# Patient Record
Sex: FEMALE | Race: WHITE | Employment: OTHER | URBAN - METROPOLITAN AREA
[De-identification: names, ages, dates, MRNs, and addresses within clinical notes are randomized per-mention and may not be internally consistent; named-entity substitution may affect disease eponyms.]

---

## 2017-09-15 ENCOUNTER — HOSPITAL ENCOUNTER (EMERGENCY)
Facility: HOSPITAL | Age: 82
Discharge: HOME | End: 2017-09-15
Attending: EMERGENCY MEDICINE
Payer: MEDICARE

## 2017-09-15 VITALS
BODY MASS INDEX: 39.27 KG/M2 | SYSTOLIC BLOOD PRESSURE: 160 MMHG | HEIGHT: 60 IN | TEMPERATURE: 99 F | DIASTOLIC BLOOD PRESSURE: 85 MMHG | RESPIRATION RATE: 19 BRPM | OXYGEN SATURATION: 98 % | WEIGHT: 200 LBS | HEART RATE: 78 BPM

## 2017-09-15 DIAGNOSIS — I49.8 SINUS ARRHYTHMIA: ICD-10-CM

## 2017-09-15 DIAGNOSIS — E11.65 TYPE 2 DIABETES MELLITUS WITH HYPERGLYCEMIA, WITH LONG-TERM CURRENT USE OF INSULIN (HCC): ICD-10-CM

## 2017-09-15 DIAGNOSIS — Z79.4 TYPE 2 DIABETES MELLITUS WITH HYPERGLYCEMIA, WITH LONG-TERM CURRENT USE OF INSULIN (HCC): ICD-10-CM

## 2017-09-15 DIAGNOSIS — L02.213 ABSCESS OF CHEST WALL: Primary | ICD-10-CM

## 2017-09-15 PROBLEM — R73.9 HYPERGLYCEMIA: Status: ACTIVE | Noted: 2017-09-15

## 2017-09-15 LAB
ANION GAP SERPL CALC-SCNC: 8 MMOL/L (ref 0–18)
BASOPHILS # BLD: 0 K/UL (ref 0–0.2)
BASOPHILS NFR BLD: 1 %
BUN SERPL-MCNC: 15 MG/DL (ref 8–20)
BUN/CREAT SERPL: 14.3 (ref 10–20)
CALCIUM SERPL-MCNC: 8.7 MG/DL (ref 8.5–10.5)
CHLORIDE SERPL-SCNC: 105 MMOL/L (ref 95–110)
CO2 SERPL-SCNC: 25 MMOL/L (ref 22–32)
CREAT SERPL-MCNC: 1.05 MG/DL (ref 0.5–1.5)
EOSINOPHIL # BLD: 0.2 K/UL (ref 0–0.7)
EOSINOPHIL NFR BLD: 2 %
ERYTHROCYTE [DISTWIDTH] IN BLOOD BY AUTOMATED COUNT: 13.6 % (ref 11–15)
GLUCOSE SERPL-MCNC: 203 MG/DL (ref 70–99)
HCT VFR BLD AUTO: 39.2 % (ref 35–48)
HGB BLD-MCNC: 13.3 G/DL (ref 12–16)
LACTATE SERPL-SCNC: 1 MMOL/L (ref 0.5–2.2)
LYMPHOCYTES # BLD: 1.3 K/UL (ref 1–4)
LYMPHOCYTES NFR BLD: 17 %
MCH RBC QN AUTO: 31.3 PG (ref 27–32)
MCHC RBC AUTO-ENTMCNC: 34 G/DL (ref 32–37)
MCV RBC AUTO: 92.1 FL (ref 80–100)
MONOCYTES # BLD: 0.8 K/UL (ref 0–1)
MONOCYTES NFR BLD: 10 %
NEUTROPHILS # BLD AUTO: 5.3 K/UL (ref 1.8–7.7)
NEUTROPHILS NFR BLD: 70 %
OSMOLALITY UR CALC.SUM OF ELEC: 293 MOSM/KG (ref 275–295)
PLATELET # BLD AUTO: 256 K/UL (ref 140–400)
PMV BLD AUTO: 6.4 FL (ref 7.4–10.3)
POTASSIUM SERPL-SCNC: 4.1 MMOL/L (ref 3.3–5.1)
RBC # BLD AUTO: 4.26 M/UL (ref 3.7–5.4)
SODIUM SERPL-SCNC: 138 MMOL/L (ref 136–144)
TROPONIN I SERPL-MCNC: 0.02 NG/ML (ref ?–0.03)
WBC # BLD AUTO: 7.6 K/UL (ref 4–11)

## 2017-09-15 PROCEDURE — 87070 CULTURE OTHR SPECIMN AEROBIC: CPT | Performed by: EMERGENCY MEDICINE

## 2017-09-15 PROCEDURE — 80048 BASIC METABOLIC PNL TOTAL CA: CPT | Performed by: EMERGENCY MEDICINE

## 2017-09-15 PROCEDURE — 99284 EMERGENCY DEPT VISIT MOD MDM: CPT

## 2017-09-15 PROCEDURE — 87147 CULTURE TYPE IMMUNOLOGIC: CPT | Performed by: EMERGENCY MEDICINE

## 2017-09-15 PROCEDURE — 83605 ASSAY OF LACTIC ACID: CPT | Performed by: EMERGENCY MEDICINE

## 2017-09-15 PROCEDURE — 10061 I&D ABSCESS COMP/MULTIPLE: CPT

## 2017-09-15 PROCEDURE — 85025 COMPLETE CBC W/AUTO DIFF WBC: CPT | Performed by: EMERGENCY MEDICINE

## 2017-09-15 PROCEDURE — 93005 ELECTROCARDIOGRAM TRACING: CPT

## 2017-09-15 PROCEDURE — 36415 COLL VENOUS BLD VENIPUNCTURE: CPT

## 2017-09-15 PROCEDURE — 93010 ELECTROCARDIOGRAM REPORT: CPT | Performed by: EMERGENCY MEDICINE

## 2017-09-15 PROCEDURE — 87205 SMEAR GRAM STAIN: CPT | Performed by: EMERGENCY MEDICINE

## 2017-09-15 PROCEDURE — 84484 ASSAY OF TROPONIN QUANT: CPT | Performed by: EMERGENCY MEDICINE

## 2017-09-15 PROCEDURE — 87186 SC STD MICRODIL/AGAR DIL: CPT | Performed by: EMERGENCY MEDICINE

## 2017-09-15 RX ORDER — INSULIN LISPRO 100 [IU]/ML
8 INJECTION, SOLUTION INTRAVENOUS; SUBCUTANEOUS
COMMUNITY

## 2017-09-15 RX ORDER — IBUPROFEN 200 MG
200 TABLET ORAL EVERY 4 HOURS PRN
Status: ON HOLD | COMMUNITY
End: 2020-02-05

## 2017-09-15 RX ORDER — ALBUTEROL SULFATE 90 UG/1
2 AEROSOL, METERED RESPIRATORY (INHALATION) EVERY 4 HOURS PRN
COMMUNITY

## 2017-09-15 RX ORDER — LIDOCAINE HYDROCHLORIDE 10 MG/ML
INJECTION, SOLUTION EPIDURAL; INFILTRATION; INTRACAUDAL; PERINEURAL
Status: COMPLETED
Start: 2017-09-15 | End: 2017-09-15

## 2017-09-15 RX ORDER — LORATADINE 10 MG/1
10 TABLET ORAL DAILY
COMMUNITY

## 2017-09-15 RX ORDER — GLUCOSAMINE SULFATE 500 MG
1000 CAPSULE ORAL 2 TIMES DAILY
COMMUNITY

## 2017-09-15 RX ORDER — GABAPENTIN 100 MG/1
100 CAPSULE ORAL 2 TIMES DAILY
COMMUNITY

## 2017-09-15 RX ORDER — AMOXICILLIN 250 MG
1 CAPSULE ORAL
COMMUNITY

## 2017-09-15 RX ORDER — SULFAMETHOXAZOLE AND TRIMETHOPRIM 800; 160 MG/1; MG/1
1 TABLET ORAL 2 TIMES DAILY
Qty: 14 TABLET | Refills: 0 | Status: SHIPPED | OUTPATIENT
Start: 2017-09-15 | End: 2017-09-22

## 2017-09-15 RX ORDER — LIDOCAINE HYDROCHLORIDE 10 MG/ML
1 INJECTION, SOLUTION EPIDURAL; INFILTRATION; INTRACAUDAL; PERINEURAL ONCE
Status: COMPLETED | OUTPATIENT
Start: 2017-09-15 | End: 2017-09-15

## 2017-09-15 NOTE — ED NOTES
Hr noted to be jumping from 90's to 115 with pulse ox for triage, placed on cardiac monitor, noted to be in a-fib. States no unusual cp/sob.

## 2017-09-15 NOTE — ED PROVIDER NOTES
Patient Seen in: [facility] AND [facility] Emergency Department    History   Patient presents with:  Abscess (integumentary)    Stated Complaint: abscess that burst yesterday    HPI    The patient is a 80-year-old female with past history of chronic renal insuff 1000]  Pulse: 106 [09/15/17 1000]  Resp: 22 [09/15/17 1000]  Temp: 98.7 °F (37.1 °C) [09/15/17 1000]  Temp src: Oral [09/15/17 1000]  SpO2: 97 % [09/15/17 1000]  O2 Device: None (Room air) [09/15/17 1051]    Current:/85   Pulse 78   Temp 98.7 °F (37. DIFFERENTIAL[[id number]]          Abnormal            Final result                 Please view results for these tests on the individual orders.    RAINBOW DRAW BLUE   RAINBOW DRAW LAVENDER   RAINBOW LIME GREEN   RAINBOW DRAW LIGHT GREEN   RAINBOW DRAW GOLD Monday  For wound re-check      Medications Prescribed:  Current Discharge Medication List    START taking these medications    Sulfamethoxazole-TMP -160 MG Oral Tab per tablet  Take 1 tablet by mouth 2 (two) times daily.   Qty: 14 tablet Refills: 0

## 2017-09-15 NOTE — ED INITIAL ASSESSMENT (HPI)
C/O having an abscess under L breast. Pt has noticed knot under her L breast for about two years. Abscess ruputred with green drainage on yes.

## 2017-10-25 ENCOUNTER — APPOINTMENT (OUTPATIENT)
Dept: CT IMAGING | Facility: HOSPITAL | Age: 82
End: 2017-10-25
Attending: EMERGENCY MEDICINE
Payer: MEDICARE

## 2017-10-25 ENCOUNTER — HOSPITAL ENCOUNTER (EMERGENCY)
Facility: HOSPITAL | Age: 82
Discharge: HOME | End: 2017-10-25
Attending: EMERGENCY MEDICINE
Payer: MEDICARE

## 2017-10-25 VITALS
OXYGEN SATURATION: 98 % | HEART RATE: 78 BPM | BODY MASS INDEX: 35.87 KG/M2 | HEIGHT: 61 IN | TEMPERATURE: 99 F | SYSTOLIC BLOOD PRESSURE: 120 MMHG | RESPIRATION RATE: 18 BRPM | DIASTOLIC BLOOD PRESSURE: 94 MMHG | WEIGHT: 190 LBS

## 2017-10-25 DIAGNOSIS — N39.0 URINARY TRACT INFECTION WITHOUT HEMATURIA, SITE UNSPECIFIED: Primary | ICD-10-CM

## 2017-10-25 DIAGNOSIS — E86.0 DEHYDRATION: ICD-10-CM

## 2017-10-25 DIAGNOSIS — M54.9 MODERATE BACK PAIN: ICD-10-CM

## 2017-10-25 PROCEDURE — 81001 URINALYSIS AUTO W/SCOPE: CPT | Performed by: EMERGENCY MEDICINE

## 2017-10-25 PROCEDURE — 85025 COMPLETE CBC W/AUTO DIFF WBC: CPT | Performed by: EMERGENCY MEDICINE

## 2017-10-25 PROCEDURE — 87086 URINE CULTURE/COLONY COUNT: CPT | Performed by: EMERGENCY MEDICINE

## 2017-10-25 PROCEDURE — 87077 CULTURE AEROBIC IDENTIFY: CPT | Performed by: EMERGENCY MEDICINE

## 2017-10-25 PROCEDURE — 96375 TX/PRO/DX INJ NEW DRUG ADDON: CPT

## 2017-10-25 PROCEDURE — 99285 EMERGENCY DEPT VISIT HI MDM: CPT

## 2017-10-25 PROCEDURE — 74176 CT ABD & PELVIS W/O CONTRAST: CPT | Performed by: EMERGENCY MEDICINE

## 2017-10-25 PROCEDURE — 96361 HYDRATE IV INFUSION ADD-ON: CPT

## 2017-10-25 PROCEDURE — 80048 BASIC METABOLIC PNL TOTAL CA: CPT | Performed by: EMERGENCY MEDICINE

## 2017-10-25 PROCEDURE — 96365 THER/PROPH/DIAG IV INF INIT: CPT

## 2017-10-25 RX ORDER — LORAZEPAM 2 MG/ML
0.25 INJECTION INTRAMUSCULAR ONCE
Status: COMPLETED | OUTPATIENT
Start: 2017-10-25 | End: 2017-10-25

## 2017-10-25 RX ORDER — SODIUM CHLORIDE 9 MG/ML
INJECTION, SOLUTION INTRAVENOUS ONCE
Status: COMPLETED | OUTPATIENT
Start: 2017-10-25 | End: 2017-10-25

## 2017-10-25 RX ORDER — CEFADROXIL 500 MG/1
500 CAPSULE ORAL 2 TIMES DAILY
Qty: 14 CAPSULE | Refills: 0 | Status: SHIPPED | OUTPATIENT
Start: 2017-10-25 | End: 2017-11-01

## 2017-10-25 RX ORDER — ONDANSETRON 2 MG/ML
4 INJECTION INTRAMUSCULAR; INTRAVENOUS ONCE
Status: COMPLETED | OUTPATIENT
Start: 2017-10-25 | End: 2017-10-25

## 2017-10-25 RX ORDER — KETOROLAC TROMETHAMINE 30 MG/ML
15 INJECTION, SOLUTION INTRAMUSCULAR; INTRAVENOUS ONCE
Status: COMPLETED | OUTPATIENT
Start: 2017-10-25 | End: 2017-10-25

## 2017-10-25 NOTE — ED PROVIDER NOTES
Patient Seen in: [facility] AND [facility] Emergency Department    History   Patient presents with:  Abdomen/Flank Pain (GI/)    Stated Complaint: left flank pain    HPI    Patient presents with complaint of pain to her left flank going into her left lower aurora sprays by Each Nare route 4 (four) times daily as needed. ibuprofen 200 MG Oral Tab,  Take 200 mg by mouth every 4 (four) hours as needed for Pain.    Albuterol Sulfate  (90 Base) MCG/ACT Inhalation Aero Soln,  Inhale 2 puffs into the lungs every 4 ronchi, or rales. Abdomen:  Soft, non-tender, non-distended. No masses, no hepato-splenomegaly. Negative McBurney's point tenderness. Musculoskeletal:  Good muscle tone.   No tenderness to the low back she is able to turn herself on her right side  Skin DIFFERENTIAL[[id number]]          Abnormal            Final result                 Please view results for these tests on the individual orders.    RAINBOW DRAW BLUE   RAINBOW DRAW LAVENDER   RAINBOW DRAW DARK GREEN   RAINBOW DRAW LIGHT GREEN   RAINBOW DRAW

## 2017-10-25 NOTE — ED NOTES
Pt report she feel better after the medication. Pt is awake and alert. Urine sample obtained and sent.

## 2018-11-15 ENCOUNTER — APPOINTMENT (OUTPATIENT)
Dept: CT IMAGING | Facility: HOSPITAL | Age: 83
End: 2018-11-15
Attending: EMERGENCY MEDICINE
Payer: MEDICARE

## 2018-11-15 ENCOUNTER — APPOINTMENT (OUTPATIENT)
Dept: GENERAL RADIOLOGY | Facility: HOSPITAL | Age: 83
End: 2018-11-15
Attending: EMERGENCY MEDICINE
Payer: MEDICARE

## 2018-11-15 ENCOUNTER — HOSPITAL ENCOUNTER (EMERGENCY)
Facility: HOSPITAL | Age: 83
Discharge: HOME | End: 2018-11-15
Attending: EMERGENCY MEDICINE
Payer: MEDICARE

## 2018-11-15 VITALS
SYSTOLIC BLOOD PRESSURE: 169 MMHG | OXYGEN SATURATION: 100 % | BODY MASS INDEX: 33.97 KG/M2 | TEMPERATURE: 98 F | RESPIRATION RATE: 18 BRPM | DIASTOLIC BLOOD PRESSURE: 68 MMHG | HEART RATE: 69 BPM | HEIGHT: 64 IN | WEIGHT: 199 LBS

## 2018-11-15 DIAGNOSIS — S82.831A CLOSED FRACTURE OF PROXIMAL END OF RIGHT FIBULA, UNSPECIFIED FRACTURE MORPHOLOGY, INITIAL ENCOUNTER: Primary | ICD-10-CM

## 2018-11-15 PROCEDURE — 72125 CT NECK SPINE W/O DYE: CPT | Performed by: EMERGENCY MEDICINE

## 2018-11-15 PROCEDURE — 73590 X-RAY EXAM OF LOWER LEG: CPT | Performed by: EMERGENCY MEDICINE

## 2018-11-15 PROCEDURE — 70450 CT HEAD/BRAIN W/O DYE: CPT | Performed by: EMERGENCY MEDICINE

## 2018-11-15 PROCEDURE — 99285 EMERGENCY DEPT VISIT HI MDM: CPT

## 2018-11-15 NOTE — ED NOTES
[employer] notified that pt has been released and will be back shortly, also updated that pt has a fracture and will need assistance ambulating with a walker.

## 2018-11-15 NOTE — ED PROVIDER NOTES
Patient Seen in: [facility] AND [facility] Emergency Department    History   Patient presents with:  Fall (musculoskeletal, neurologic)    Stated Complaint: fell 5 days ago, left ankle.      HPI    Patient is a 80-year-old female who states she was getting out of cm (5' 4\")   Wt 90.3 kg   SpO2 100%   BMI 34.16 kg/m²         Physical Exam    Constitutional: Oriented to person, place, and time. Appears well-developed and well-nourished.    HEENT:   Head: There is some tenderness her left occiput there is no swelling (CST): Umu Issa MD on 11/15/2018 at 10:44     Approved by (CST): Umu Issa MD on 11/15/2018 at 10:47          Ct Brain Or Head ([id number])    Result Date: 11/15/2018  CONCLUSION:  1. No acute intracranial process.  2.  There are mild microvascular w

## 2018-11-15 NOTE — ED NOTES
Pt ambulated to bathroom and back to bed with walker and standby assist. Tolerating weight bearing on LLE.

## 2018-11-15 NOTE — ED INITIAL ASSESSMENT (HPI)
Pt tripped on a rug 5 days ago, pt is c/o left ankle pain, back pain and right leg pain. Unknown head injury, no LOC.

## 2020-01-30 ENCOUNTER — HOSPITAL ENCOUNTER (EMERGENCY)
Facility: HOSPITAL | Age: 85
Discharge: HOME | DRG: 564 | End: 2020-01-31
Attending: EMERGENCY MEDICINE
Payer: MEDICARE
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
## 2020-01-30 DIAGNOSIS — B34.9 VIRAL SYNDROME: Primary | ICD-10-CM

## 2020-01-30 DIAGNOSIS — E86.0 MILD DEHYDRATION: ICD-10-CM

## 2020-01-30 PROCEDURE — 99285 EMERGENCY DEPT VISIT HI MDM: CPT

## 2020-01-30 PROCEDURE — 96360 HYDRATION IV INFUSION INIT: CPT

## 2020-01-31 ENCOUNTER — APPOINTMENT (OUTPATIENT)
Dept: GENERAL RADIOLOGY | Facility: HOSPITAL | Age: 85
DRG: 564 | End: 2020-01-31
Attending: EMERGENCY MEDICINE
Payer: MEDICARE

## 2020-01-31 ENCOUNTER — APPOINTMENT (OUTPATIENT)
Dept: CT IMAGING | Facility: HOSPITAL | Age: 85
DRG: 564 | End: 2020-01-31
Attending: EMERGENCY MEDICINE
Payer: MEDICARE

## 2020-01-31 VITALS
BODY MASS INDEX: 35.43 KG/M2 | RESPIRATION RATE: 21 BRPM | HEART RATE: 73 BPM | WEIGHT: 187.63 LBS | HEIGHT: 61 IN | TEMPERATURE: 100 F | OXYGEN SATURATION: 97 % | DIASTOLIC BLOOD PRESSURE: 92 MMHG | SYSTOLIC BLOOD PRESSURE: 157 MMHG

## 2020-01-31 VITALS
BODY MASS INDEX: 33.13 KG/M2 | HEIGHT: 62 IN | HEART RATE: 73 BPM | RESPIRATION RATE: 16 BRPM | OXYGEN SATURATION: 94 % | WEIGHT: 180 LBS | SYSTOLIC BLOOD PRESSURE: 120 MMHG | DIASTOLIC BLOOD PRESSURE: 47 MMHG | TEMPERATURE: 98 F

## 2020-01-31 DIAGNOSIS — M25.561 ACUTE PAIN OF BOTH KNEES: Primary | ICD-10-CM

## 2020-01-31 DIAGNOSIS — S09.90XA INJURY OF HEAD, INITIAL ENCOUNTER: ICD-10-CM

## 2020-01-31 DIAGNOSIS — M25.562 ACUTE PAIN OF BOTH KNEES: Primary | ICD-10-CM

## 2020-01-31 DIAGNOSIS — J11.1 INFLUENZA: ICD-10-CM

## 2020-01-31 LAB
ADENOVIRUS PCR:: NEGATIVE
ANION GAP SERPL CALC-SCNC: 6 MMOL/L (ref 0–18)
B PERT DNA SPEC QL NAA+PROBE: NEGATIVE
BACTERIA UR QL AUTO: NEGATIVE /HPF
BASOPHILS # BLD AUTO: 0.04 X10(3) UL (ref 0–0.2)
BASOPHILS NFR BLD AUTO: 0.7 %
BILIRUB UR QL: NEGATIVE
BUN BLD-MCNC: 25 MG/DL (ref 7–18)
BUN/CREAT SERPL: 23.6 (ref 10–20)
C PNEUM DNA SPEC QL NAA+PROBE: NEGATIVE
CALCIUM BLD-MCNC: 8.6 MG/DL (ref 8.5–10.1)
CHLORIDE SERPL-SCNC: 106 MMOL/L (ref 98–112)
CLARITY UR: CLEAR
CO2 SERPL-SCNC: 27 MMOL/L (ref 21–32)
COLOR UR: YELLOW
CORONAVIRUS 229E PCR:: NEGATIVE
CORONAVIRUS HKU1 PCR:: NEGATIVE
CORONAVIRUS NL63 PCR:: NEGATIVE
CORONAVIRUS OC43 PCR:: NEGATIVE
CREAT BLD-MCNC: 1.06 MG/DL (ref 0.55–1.02)
DEPRECATED RDW RBC AUTO: 45.7 FL (ref 35.1–46.3)
EOSINOPHIL # BLD AUTO: 0.07 X10(3) UL (ref 0–0.7)
EOSINOPHIL NFR BLD AUTO: 1.2 %
ERYTHROCYTE [DISTWIDTH] IN BLOOD BY AUTOMATED COUNT: 12.9 % (ref 11–15)
FLUAV H1 2009 PAND RNA SPEC QL NAA+PROBE: POSITIVE
FLUBV RNA SPEC QL NAA+PROBE: NEGATIVE
GLUCOSE BLD-MCNC: 149 MG/DL (ref 70–99)
GLUCOSE UR-MCNC: NEGATIVE MG/DL
HCT VFR BLD AUTO: 38.8 % (ref 35–48)
HGB BLD-MCNC: 12.4 G/DL (ref 12–16)
IMM GRANULOCYTES # BLD AUTO: 0.05 X10(3) UL (ref 0–1)
IMM GRANULOCYTES NFR BLD: 0.9 %
KETONES UR-MCNC: NEGATIVE MG/DL
LEUKOCYTE ESTERASE UR QL STRIP.AUTO: NEGATIVE
LYMPHOCYTES # BLD AUTO: 1.01 X10(3) UL (ref 1–4)
LYMPHOCYTES NFR BLD AUTO: 17.6 %
MCH RBC QN AUTO: 30.7 PG (ref 26–34)
MCHC RBC AUTO-ENTMCNC: 32 G/DL (ref 31–37)
MCV RBC AUTO: 96 FL (ref 80–100)
METAPNEUMOVIRUS PCR:: NEGATIVE
MONOCYTES # BLD AUTO: 0.9 X10(3) UL (ref 0.1–1)
MONOCYTES NFR BLD AUTO: 15.7 %
MYCOPLASMA PNEUMONIA PCR:: NEGATIVE
NEUTROPHILS # BLD AUTO: 3.66 X10 (3) UL (ref 1.5–7.7)
NEUTROPHILS # BLD AUTO: 3.66 X10(3) UL (ref 1.5–7.7)
NEUTROPHILS NFR BLD AUTO: 63.9 %
NITRITE UR QL STRIP.AUTO: NEGATIVE
OSMOLALITY SERPL CALC.SUM OF ELEC: 295 MOSM/KG (ref 275–295)
PARAINFLUENZA 1 PCR:: NEGATIVE
PARAINFLUENZA 2 PCR:: NEGATIVE
PARAINFLUENZA 3 PCR:: NEGATIVE
PARAINFLUENZA 4 PCR:: NEGATIVE
PH UR: 5 [PH] (ref 5–8)
PLATELET # BLD AUTO: 163 10(3)UL (ref 150–450)
POTASSIUM SERPL-SCNC: 4.3 MMOL/L (ref 3.5–5.1)
PROT UR-MCNC: NEGATIVE MG/DL
RBC # BLD AUTO: 4.04 X10(6)UL (ref 3.8–5.3)
RBC #/AREA URNS AUTO: 41 /HPF
RHINOVIRUS/ENTERO PCR:: NEGATIVE
RSV RNA SPEC QL NAA+PROBE: NEGATIVE
SODIUM SERPL-SCNC: 139 MMOL/L (ref 136–145)
SP GR UR STRIP: 1.02 (ref 1–1.03)
TROPONIN I SERPL-MCNC: <0.045 NG/ML (ref ?–0.04)
UROBILINOGEN UR STRIP-ACNC: <2
WBC # BLD AUTO: 5.7 X10(3) UL (ref 4–11)
WBC #/AREA URNS AUTO: 1 /HPF

## 2020-01-31 PROCEDURE — 87633 RESP VIRUS 12-25 TARGETS: CPT | Performed by: EMERGENCY MEDICINE

## 2020-01-31 PROCEDURE — 70450 CT HEAD/BRAIN W/O DYE: CPT | Performed by: EMERGENCY MEDICINE

## 2020-01-31 PROCEDURE — 87798 DETECT AGENT NOS DNA AMP: CPT | Performed by: EMERGENCY MEDICINE

## 2020-01-31 PROCEDURE — 84484 ASSAY OF TROPONIN QUANT: CPT | Performed by: EMERGENCY MEDICINE

## 2020-01-31 PROCEDURE — 87581 M.PNEUMON DNA AMP PROBE: CPT | Performed by: EMERGENCY MEDICINE

## 2020-01-31 PROCEDURE — 93005 ELECTROCARDIOGRAM TRACING: CPT

## 2020-01-31 PROCEDURE — 73560 X-RAY EXAM OF KNEE 1 OR 2: CPT | Performed by: EMERGENCY MEDICINE

## 2020-01-31 PROCEDURE — 85025 COMPLETE CBC W/AUTO DIFF WBC: CPT | Performed by: EMERGENCY MEDICINE

## 2020-01-31 PROCEDURE — 80048 BASIC METABOLIC PNL TOTAL CA: CPT | Performed by: EMERGENCY MEDICINE

## 2020-01-31 PROCEDURE — 71045 X-RAY EXAM CHEST 1 VIEW: CPT | Performed by: EMERGENCY MEDICINE

## 2020-01-31 PROCEDURE — 99285 EMERGENCY DEPT VISIT HI MDM: CPT

## 2020-01-31 PROCEDURE — 72125 CT NECK SPINE W/O DYE: CPT | Performed by: EMERGENCY MEDICINE

## 2020-01-31 PROCEDURE — 93010 ELECTROCARDIOGRAM REPORT: CPT | Performed by: EMERGENCY MEDICINE

## 2020-01-31 PROCEDURE — 87486 CHLMYD PNEUM DNA AMP PROBE: CPT | Performed by: EMERGENCY MEDICINE

## 2020-01-31 PROCEDURE — 81001 URINALYSIS AUTO W/SCOPE: CPT | Performed by: EMERGENCY MEDICINE

## 2020-01-31 RX ORDER — ACETAMINOPHEN 325 MG/1
650 TABLET ORAL ONCE
Status: COMPLETED | OUTPATIENT
Start: 2020-01-31 | End: 2020-01-31

## 2020-01-31 NOTE — ED NOTES
Patient presents with increased weakness and falls. Complaining of headache and neck pain. AOx2-3 history of dementia. No obvious deformities noted.  Denies LOC

## 2020-01-31 NOTE — ED NOTES
Patient ambulated with ed tech using walker with steady gait. Patient to be discharged home.  Will arrange transport

## 2020-01-31 NOTE — ED NOTES
[facility] contacted by [facility] stated they are on premises and will be here for patient  in less than 10 minutes.

## 2020-01-31 NOTE — ED NOTES
Assorted to the bathroom with wheelchair. Sitting in wheelchair in hallway, patient states more comfortable than cart. Updated on awaiting CT scan.

## 2020-01-31 NOTE — ED NOTES
This RN spoke with  from [facility], she stated the patient's nurse has gone home for the day, and discharge paperwork will be reviewed by nursing assistant upon patient's return to [facility].

## 2020-01-31 NOTE — ED PROVIDER NOTES
Patient Seen in: [facility] Emergency Department      History   Patient presents with:  Fall    Stated Complaint: increased in falls and weakness    HPI    80year-old female resident at [facility] who fell while trying to go to the bathroom tonight Exam    Constitutional: Oriented to person, place, and time. Appears well-developed. No distress. Head: Normocephalic and atraumatic. Eyes: Conjunctivae are normal. Pupils are equal, round, and reactive to light.    Neck: No obvious signs of trauma but Abnormality         Status                     ---------                               -----------         ------                     CBC W/ DIFFERENTIAL[[id number]]                              Final result                 Please view results for these well aerated. Prior bilateral ocular lens surgery. CERVICAL SPINE  No acute fracture or traumatic malalignment in the cervical spine. Stable minimal C4-5 and C5-6 anterolistheses.  Moderately severe cervical spondylosis, most prominent from C5-6 to C7-T1

## 2020-01-31 NOTE — ED PROVIDER NOTES
Patient Seen in: [facility] AND [facility] Emergency Department      History   Patient presents with:  Fall  Musculoskeletal Problem    Stated Complaint: Fall; bilat knee pain    HPI    27-year-old female with past medical history significant for chronic kidney Temp 98.3 °F (36.8 °C) (Temporal)   Resp 16   Ht 157.5 cm (5' 2\")   Wt 81.6 kg   SpO2 94%   BMI 32.92 kg/m²         Physical Exam    Physical Exam   Constitutional: AAOx3, well nourished, NAD  Head: Normocephalic and atraumatic.    Ears: TM's clear b/l  No ([id number])    Result Date: 1/31/2020  CONCLUSION:   No acute intracranial abnormality. Moderate chronic microvascular white matter ischemic change.     Dictated by (CST): Felipe Willard MD on 1/31/2020 at 14:31     Approved by (CST): Felipe Willard MD o diarrhea. Patient is adamant about leaving the hospital immediately.   Will write prescription for antiviral.              Disposition and Plan     Clinical Impression:  Acute pain of both knees  (primary encounter diagnosis)  Injury of head, initial encou

## 2020-01-31 NOTE — ED NOTES
Attempted report to a nurse at [facility] and was hung up on after being on hold. Will attempt again.

## 2020-02-01 ENCOUNTER — HOSPITAL ENCOUNTER (INPATIENT)
Facility: HOSPITAL | Age: 85
LOS: 4 days | Discharge: NURSING, REHAB OR LONG-TERM CARE | DRG: 564 | End: 2020-02-05
Attending: EMERGENCY MEDICINE | Admitting: INTERNAL MEDICINE
Payer: MEDICARE

## 2020-02-01 ENCOUNTER — APPOINTMENT (OUTPATIENT)
Dept: CT IMAGING | Facility: HOSPITAL | Age: 85
DRG: 564 | End: 2020-02-01
Attending: EMERGENCY MEDICINE
Payer: MEDICARE

## 2020-02-01 ENCOUNTER — APPOINTMENT (OUTPATIENT)
Dept: GENERAL RADIOLOGY | Facility: HOSPITAL | Age: 85
DRG: 564 | End: 2020-02-01
Attending: EMERGENCY MEDICINE
Payer: MEDICARE

## 2020-02-01 DIAGNOSIS — M62.82 NON-TRAUMATIC RHABDOMYOLYSIS: Primary | ICD-10-CM

## 2020-02-01 DIAGNOSIS — R29.6 FALLING EPISODES: ICD-10-CM

## 2020-02-01 DIAGNOSIS — B37.2 CUTANEOUS CANDIDIASIS: ICD-10-CM

## 2020-02-01 PROBLEM — M81.0 AGE-RELATED OSTEOPOROSIS WITHOUT CURRENT PATHOLOGICAL FRACTURE: Status: ACTIVE | Noted: 2020-02-01

## 2020-02-01 PROBLEM — R79.89 AZOTEMIA: Status: ACTIVE | Noted: 2020-02-01

## 2020-02-01 PROBLEM — T79.6XXA TRAUMATIC RHABDOMYOLYSIS (HCC): Status: ACTIVE | Noted: 2020-02-01

## 2020-02-01 PROBLEM — E11.9 DIABETES (HCC): Status: ACTIVE | Noted: 2020-02-01

## 2020-02-01 PROBLEM — J10.1 INFLUENZA A: Status: ACTIVE | Noted: 2020-02-01

## 2020-02-01 PROBLEM — F01.50 VASCULAR DEMENTIA WITHOUT BEHAVIORAL DISTURBANCE (HCC): Status: ACTIVE | Noted: 2020-02-01

## 2020-02-01 PROBLEM — N17.9 PRERENAL ACUTE RENAL FAILURE (HCC): Status: ACTIVE | Noted: 2020-02-01

## 2020-02-01 PROBLEM — G93.41 ACUTE METABOLIC ENCEPHALOPATHY: Status: ACTIVE | Noted: 2020-02-01

## 2020-02-01 LAB
ANION GAP SERPL CALC-SCNC: 2 MMOL/L (ref 0–18)
BACTERIA UR QL AUTO: NEGATIVE /HPF
BASOPHILS # BLD AUTO: 0.03 X10(3) UL (ref 0–0.2)
BASOPHILS NFR BLD AUTO: 0.6 %
BILIRUB UR QL: NEGATIVE
BUN BLD-MCNC: 25 MG/DL (ref 7–18)
BUN/CREAT SERPL: 20.7 (ref 10–20)
CALCIUM BLD-MCNC: 9 MG/DL (ref 8.5–10.1)
CHLORIDE SERPL-SCNC: 108 MMOL/L (ref 98–112)
CK SERPL-CCNC: 294 U/L (ref 26–192)
CLARITY UR: CLEAR
CO2 SERPL-SCNC: 26 MMOL/L (ref 21–32)
COLOR UR: YELLOW
CREAT BLD-MCNC: 1.21 MG/DL (ref 0.55–1.02)
DEPRECATED RDW RBC AUTO: 47.4 FL (ref 35.1–46.3)
EOSINOPHIL # BLD AUTO: 0.02 X10(3) UL (ref 0–0.7)
EOSINOPHIL NFR BLD AUTO: 0.4 %
ERYTHROCYTE [DISTWIDTH] IN BLOOD BY AUTOMATED COUNT: 13.2 % (ref 11–15)
EST. AVERAGE GLUCOSE BLD GHB EST-MCNC: 140 MG/DL (ref 68–126)
GLUCOSE BLD-MCNC: 115 MG/DL (ref 70–99)
GLUCOSE BLDC GLUCOMTR-MCNC: 111 MG/DL (ref 70–99)
GLUCOSE BLDC GLUCOMTR-MCNC: 116 MG/DL (ref 70–99)
GLUCOSE BLDC GLUCOMTR-MCNC: 133 MG/DL (ref 70–99)
GLUCOSE BLDC GLUCOMTR-MCNC: 149 MG/DL (ref 70–99)
GLUCOSE UR-MCNC: NEGATIVE MG/DL
HBA1C MFR BLD HPLC: 6.5 % (ref ?–5.7)
HCT VFR BLD AUTO: 41.6 % (ref 35–48)
HGB BLD-MCNC: 13.6 G/DL (ref 12–16)
HYALINE CASTS #/AREA URNS AUTO: 4 /LPF
IMM GRANULOCYTES # BLD AUTO: 0.03 X10(3) UL (ref 0–1)
IMM GRANULOCYTES NFR BLD: 0.6 %
LEUKOCYTE ESTERASE UR QL STRIP.AUTO: NEGATIVE
LYMPHOCYTES # BLD AUTO: 1.07 X10(3) UL (ref 1–4)
LYMPHOCYTES NFR BLD AUTO: 19.8 %
MCH RBC QN AUTO: 31.6 PG (ref 26–34)
MCHC RBC AUTO-ENTMCNC: 32.7 G/DL (ref 31–37)
MCV RBC AUTO: 96.5 FL (ref 80–100)
MONOCYTES # BLD AUTO: 1 X10(3) UL (ref 0.1–1)
MONOCYTES NFR BLD AUTO: 18.5 %
MRSA DNA SPEC QL NAA+PROBE: POSITIVE
NEUTROPHILS # BLD AUTO: 3.25 X10 (3) UL (ref 1.5–7.7)
NEUTROPHILS # BLD AUTO: 3.25 X10(3) UL (ref 1.5–7.7)
NEUTROPHILS NFR BLD AUTO: 60.1 %
NITRITE UR QL STRIP.AUTO: NEGATIVE
OSMOLALITY SERPL CALC.SUM OF ELEC: 287 MOSM/KG (ref 275–295)
PH UR: 5 [PH] (ref 5–8)
PLATELET # BLD AUTO: 152 10(3)UL (ref 150–450)
POTASSIUM SERPL-SCNC: 3.9 MMOL/L (ref 3.5–5.1)
PROT UR-MCNC: NEGATIVE MG/DL
RBC # BLD AUTO: 4.31 X10(6)UL (ref 3.8–5.3)
RBC #/AREA URNS AUTO: 5 /HPF
SODIUM SERPL-SCNC: 136 MMOL/L (ref 136–145)
SP GR UR STRIP: 1.01 (ref 1–1.03)
UROBILINOGEN UR STRIP-ACNC: <2
WBC # BLD AUTO: 5.4 X10(3) UL (ref 4–11)
WBC #/AREA URNS AUTO: 1 /HPF

## 2020-02-01 PROCEDURE — 82962 GLUCOSE BLOOD TEST: CPT

## 2020-02-01 PROCEDURE — 83036 HEMOGLOBIN GLYCOSYLATED A1C: CPT | Performed by: INTERNAL MEDICINE

## 2020-02-01 PROCEDURE — 70450 CT HEAD/BRAIN W/O DYE: CPT | Performed by: EMERGENCY MEDICINE

## 2020-02-01 PROCEDURE — 71045 X-RAY EXAM CHEST 1 VIEW: CPT | Performed by: EMERGENCY MEDICINE

## 2020-02-01 PROCEDURE — 99285 EMERGENCY DEPT VISIT HI MDM: CPT

## 2020-02-01 PROCEDURE — 82550 ASSAY OF CK (CPK): CPT | Performed by: EMERGENCY MEDICINE

## 2020-02-01 PROCEDURE — 80048 BASIC METABOLIC PNL TOTAL CA: CPT | Performed by: EMERGENCY MEDICINE

## 2020-02-01 PROCEDURE — 96361 HYDRATE IV INFUSION ADD-ON: CPT

## 2020-02-01 PROCEDURE — 81001 URINALYSIS AUTO W/SCOPE: CPT | Performed by: EMERGENCY MEDICINE

## 2020-02-01 PROCEDURE — 72170 X-RAY EXAM OF PELVIS: CPT | Performed by: EMERGENCY MEDICINE

## 2020-02-01 PROCEDURE — 87641 MR-STAPH DNA AMP PROBE: CPT | Performed by: EMERGENCY MEDICINE

## 2020-02-01 PROCEDURE — 85025 COMPLETE CBC W/AUTO DIFF WBC: CPT | Performed by: EMERGENCY MEDICINE

## 2020-02-01 PROCEDURE — 96360 HYDRATION IV INFUSION INIT: CPT

## 2020-02-01 RX ORDER — HEPARIN SODIUM 5000 [USP'U]/ML
5000 INJECTION, SOLUTION INTRAVENOUS; SUBCUTANEOUS EVERY 12 HOURS SCHEDULED
Status: DISCONTINUED | OUTPATIENT
Start: 2020-02-01 | End: 2020-02-05

## 2020-02-01 RX ORDER — ACETAMINOPHEN 325 MG/1
650 TABLET ORAL EVERY 6 HOURS PRN
Status: DISCONTINUED | OUTPATIENT
Start: 2020-02-01 | End: 2020-02-05

## 2020-02-01 RX ORDER — SENNA AND DOCUSATE SODIUM 50; 8.6 MG/1; MG/1
1 TABLET, FILM COATED ORAL
Status: DISCONTINUED | OUTPATIENT
Start: 2020-02-01 | End: 2020-02-05

## 2020-02-01 RX ORDER — ALBUTEROL SULFATE 90 UG/1
2 AEROSOL, METERED RESPIRATORY (INHALATION) EVERY 4 HOURS PRN
Status: DISCONTINUED | OUTPATIENT
Start: 2020-02-01 | End: 2020-02-05

## 2020-02-01 RX ORDER — SODIUM CHLORIDE 9 MG/ML
INJECTION, SOLUTION INTRAVENOUS CONTINUOUS
Status: DISCONTINUED | OUTPATIENT
Start: 2020-02-01 | End: 2020-02-03

## 2020-02-01 RX ORDER — IBUPROFEN 200 MG
200 TABLET ORAL EVERY 6 HOURS PRN
Status: DISCONTINUED | OUTPATIENT
Start: 2020-02-01 | End: 2020-02-05

## 2020-02-01 RX ORDER — GABAPENTIN 100 MG/1
100 CAPSULE ORAL 2 TIMES DAILY
Status: DISCONTINUED | OUTPATIENT
Start: 2020-02-01 | End: 2020-02-05

## 2020-02-01 RX ORDER — POLYETHYLENE GLYCOL 3350 17 G/17G
1 POWDER, FOR SOLUTION ORAL
Status: DISCONTINUED | OUTPATIENT
Start: 2020-02-01 | End: 2020-02-05

## 2020-02-01 RX ORDER — CLOTRIMAZOLE 1 %
CREAM (GRAM) TOPICAL ONCE
Status: COMPLETED | OUTPATIENT
Start: 2020-02-01 | End: 2020-02-01

## 2020-02-01 RX ORDER — SODIUM CHLORIDE 9 MG/ML
INJECTION, SOLUTION INTRAVENOUS CONTINUOUS
Status: ACTIVE | OUTPATIENT
Start: 2020-02-01 | End: 2020-02-01

## 2020-02-01 RX ORDER — OSELTAMIVIR PHOSPHATE 75 MG/1
75 CAPSULE ORAL 2 TIMES DAILY
Status: DISCONTINUED | OUTPATIENT
Start: 2020-02-01 | End: 2020-02-01

## 2020-02-01 RX ORDER — DEXTROSE MONOHYDRATE 25 G/50ML
50 INJECTION, SOLUTION INTRAVENOUS AS NEEDED
Status: DISCONTINUED | OUTPATIENT
Start: 2020-02-01 | End: 2020-02-05

## 2020-02-01 RX ORDER — OSELTAMIVIR PHOSPHATE 30 MG/1
30 CAPSULE ORAL
Status: DISCONTINUED | OUTPATIENT
Start: 2020-02-01 | End: 2020-02-05

## 2020-02-01 RX ORDER — SODIUM CHLORIDE 0.9 % (FLUSH) 0.9 %
3 SYRINGE (ML) INJECTION AS NEEDED
Status: DISCONTINUED | OUTPATIENT
Start: 2020-02-01 | End: 2020-02-05

## 2020-02-01 RX ORDER — ECHINACEA PURPUREA EXTRACT 125 MG
1 TABLET ORAL 4 TIMES DAILY PRN
Status: DISCONTINUED | OUTPATIENT
Start: 2020-02-01 | End: 2020-02-05

## 2020-02-01 NOTE — CM/SW NOTE
2/2: Received MDO for Advanced Directives. Patient is from [facility] AND [facility]. Spoke with son Montrell Carroll, he states that his sister Kyra Batista ([phone]) is [facility] and has the paperwork. She lives in [place], he tried reaching her earlier with no success.      P

## 2020-02-01 NOTE — ED PROVIDER NOTES
Patient Seen in: [facility] AND [facility] Emergency Department      History   Patient presents with:  Fall    Stated Complaint:     HPI    49-year-old female with past medical history significant for chronic kidney disease, dementia, depression, peripheral neur Normocephalic and atraumatic. Ears: TM's clear b/l  Nose: Nose normal.   Mouth/Throat: MMM, post OP clear with no exudates  Eyes: Conjunctivae and EOM are normal. PERRLA  Neck: Normal range of motion. Neck supple. No tracheal deviation present.    CV: s1s Abnormality         Status                     ---------                               -----------         ------                     CBC W/ DIFFERENTIAL[[id number]]          Abnormal            Final result                 Please view resu infection on her lower abdominal wall. Will order antifungal creams. Patient is receiving IV hydration. Discussed with her primary care physician is accepted her for admission. Case management will work on long-term placement.   Admission disposition: 2

## 2020-02-01 NOTE — ED INITIAL ASSESSMENT (HPI)
Pt had a fall and is from [place]. Down time is unknown. Medics do not have much information on the pt. Pt states her right hip/leg. Pt covered in feces as well.  Pt in c-collar from ems

## 2020-02-01 NOTE — H&P
[facility]    History and Physical    Nazia Toth Patient Status:  Observation    1922 MRN [id number]   Location [facility] 5SW/SE Attending Iron Aguayo MD   Hosp Day # 0 PCP Alejandro Carreon MD     Date:  2020  Date of Subcutaneous Solution, Inject 40 Units into the skin every morning. Saline (DEEP SEA NASAL SPRAY NA), 2 sprays by Each Nare route 4 (four) times daily as needed. ibuprofen 200 MG Oral Tab, Take 200 mg by mouth every 4 (four) hours as needed for Pain.   Al sounds normal.   Few rhonchi with cough. Abdominal: Soft. Bowel sounds are normal. She exhibits no distension. There is no tenderness. Musculoskeletal: Normal range of motion. Neurological: She is alert.  No cranial nerve deficit, sensory deficit or m 2/01/2020 at 13:26     Approved by (CST): Claudia Mckeon MD on 2/01/2020 at 13:29          Ct Brain Or Head ([id number])    Result Date: 2/1/2020  CONCLUSION:  1. No acute intracranial process.  2.  There are mild microvascular white matter ischemic changes, (CST): Conchita De La Cruz MD on 2/01/2020 at 13:22          Xr Chest Ap Portable  (cpt=71045)    Result Date: 1/31/2020  CONCLUSION:   Mild bibasilar atelectasis, otherwise no acute cardiopulmonary abnormality.   Preliminary report was submitted by the [facility] inpatient services that will reasonably be expected to span two midnight's based on the clinical documentation in H+P. Based on patients current state of illness, I anticipate that, after discharge, patient will require TBD.         Phyllis Oliva MD  2/1/

## 2020-02-01 NOTE — PLAN OF CARE
Problem: Patient Centered Care  Goal: Patient preferences are identified and integrated in the patient's plan of care  Description  Interventions:  - What would you like us to know as we care for you?  \"I need my wallet and check book\"  - Provide timely

## 2020-02-01 NOTE — CM/SW NOTE
I spoke with Jenelledann Vargas the pt's son and providing information on his mom's 3 ED visits within the last 24 hours. His sister is the [place] and lives in [place]. He is calling his sister and will call me back.

## 2020-02-01 NOTE — ED NOTES
Orders for admission, patient is aware of plan and ready to go upstairs.  Any questions, please call ED RN Lissette Blanco  at extension [phone]

## 2020-02-01 NOTE — CM/SW NOTE
Pt's daughter called back and I provided her with Kailey HOLT number. She agrees that her mom will need to be placed at a SNF.  Brittney's number is  #[phone]

## 2020-02-02 LAB
ALBUMIN SERPL-MCNC: 2.8 G/DL (ref 3.4–5)
ALBUMIN/GLOB SERPL: 0.9 {RATIO} (ref 1–2)
ALP LIVER SERPL-CCNC: 88 U/L (ref 55–142)
ALT SERPL-CCNC: 20 U/L (ref 13–56)
ANION GAP SERPL CALC-SCNC: 4 MMOL/L (ref 0–18)
AST SERPL-CCNC: 36 U/L (ref 15–37)
BASOPHILS # BLD AUTO: 0.03 X10(3) UL (ref 0–0.2)
BASOPHILS NFR BLD AUTO: 0.7 %
BILIRUB SERPL-MCNC: 0.3 MG/DL (ref 0.1–2)
BUN BLD-MCNC: 24 MG/DL (ref 7–18)
BUN/CREAT SERPL: 22.2 (ref 10–20)
CALCIUM BLD-MCNC: 7.8 MG/DL (ref 8.5–10.1)
CHLORIDE SERPL-SCNC: 112 MMOL/L (ref 98–112)
CK SERPL-CCNC: 188 U/L (ref 26–192)
CO2 SERPL-SCNC: 27 MMOL/L (ref 21–32)
CREAT BLD-MCNC: 1.08 MG/DL (ref 0.55–1.02)
DEPRECATED RDW RBC AUTO: 47.6 FL (ref 35.1–46.3)
EOSINOPHIL # BLD AUTO: 0.14 X10(3) UL (ref 0–0.7)
EOSINOPHIL NFR BLD AUTO: 3.4 %
ERYTHROCYTE [DISTWIDTH] IN BLOOD BY AUTOMATED COUNT: 13.2 % (ref 11–15)
GLOBULIN PLAS-MCNC: 3.2 G/DL (ref 2.8–4.4)
GLUCOSE BLD-MCNC: 54 MG/DL (ref 70–99)
GLUCOSE BLDC GLUCOMTR-MCNC: 132 MG/DL (ref 70–99)
GLUCOSE BLDC GLUCOMTR-MCNC: 136 MG/DL (ref 70–99)
GLUCOSE BLDC GLUCOMTR-MCNC: 153 MG/DL (ref 70–99)
GLUCOSE BLDC GLUCOMTR-MCNC: 185 MG/DL (ref 70–99)
GLUCOSE BLDC GLUCOMTR-MCNC: 67 MG/DL (ref 70–99)
HCT VFR BLD AUTO: 35.7 % (ref 35–48)
HGB BLD-MCNC: 11.5 G/DL (ref 12–16)
IMM GRANULOCYTES # BLD AUTO: 0.02 X10(3) UL (ref 0–1)
IMM GRANULOCYTES NFR BLD: 0.5 %
LYMPHOCYTES # BLD AUTO: 1.63 X10(3) UL (ref 1–4)
LYMPHOCYTES NFR BLD AUTO: 39.2 %
M PROTEIN MFR SERPL ELPH: 6 G/DL (ref 6.4–8.2)
MCH RBC QN AUTO: 31.4 PG (ref 26–34)
MCHC RBC AUTO-ENTMCNC: 32.2 G/DL (ref 31–37)
MCV RBC AUTO: 97.5 FL (ref 80–100)
MONOCYTES # BLD AUTO: 0.86 X10(3) UL (ref 0.1–1)
MONOCYTES NFR BLD AUTO: 20.7 %
NEUTROPHILS # BLD AUTO: 1.48 X10 (3) UL (ref 1.5–7.7)
NEUTROPHILS # BLD AUTO: 1.48 X10(3) UL (ref 1.5–7.7)
NEUTROPHILS NFR BLD AUTO: 35.5 %
OSMOLALITY SERPL CALC.SUM OF ELEC: 298 MOSM/KG (ref 275–295)
PLATELET # BLD AUTO: 136 10(3)UL (ref 150–450)
POTASSIUM SERPL-SCNC: 3.6 MMOL/L (ref 3.5–5.1)
RBC # BLD AUTO: 3.66 X10(6)UL (ref 3.8–5.3)
SODIUM SERPL-SCNC: 143 MMOL/L (ref 136–145)
WBC # BLD AUTO: 4.2 X10(3) UL (ref 4–11)

## 2020-02-02 PROCEDURE — 97166 OT EVAL MOD COMPLEX 45 MIN: CPT

## 2020-02-02 PROCEDURE — 82962 GLUCOSE BLOOD TEST: CPT

## 2020-02-02 PROCEDURE — 96372 THER/PROPH/DIAG INJ SC/IM: CPT

## 2020-02-02 PROCEDURE — 97162 PT EVAL MOD COMPLEX 30 MIN: CPT

## 2020-02-02 PROCEDURE — 97535 SELF CARE MNGMENT TRAINING: CPT

## 2020-02-02 PROCEDURE — 82550 ASSAY OF CK (CPK): CPT | Performed by: INTERNAL MEDICINE

## 2020-02-02 PROCEDURE — 85025 COMPLETE CBC W/AUTO DIFF WBC: CPT | Performed by: INTERNAL MEDICINE

## 2020-02-02 PROCEDURE — 80053 COMPREHEN METABOLIC PANEL: CPT | Performed by: INTERNAL MEDICINE

## 2020-02-02 PROCEDURE — 97530 THERAPEUTIC ACTIVITIES: CPT

## 2020-02-02 NOTE — PLAN OF CARE
Problem: Patient Centered Care  Goal: Patient preferences are identified and integrated in the patient's plan of care  Description  Interventions:  - What would you like us to know as we care for you? Pt is from [facility]. She has hx Dementia.   - Provide t home or other facility with appropriate resources  Description  INTERVENTIONS:  - Identify barriers to discharge w/pt and caregiver  - Include patient/family/discharge partner in discharge planning  - Arrange for needed discharge resources and transportati patient/family in tolerated activity level and precautions    Outcome: Progressing     Problem: NEUROLOGICAL - ADULT  Goal: Achieves stable or improved neurological status  Description  INTERVENTIONS  - Assess for and report changes in neurological status

## 2020-02-02 NOTE — PROGRESS NOTES
[facility] Pharmacy Note:  Renal Adjustment for oseltamivir (TAMIFLU)    Anabel Pizano is a 80year old female who has been prescribed oseltamivir (TAMIFLU) 75 mg every 12 hrs.   CrCl is estimated creatinine clearance is 22 mL/min (A) (based on SCr of 1.21 mg/dL

## 2020-02-02 NOTE — PHYSICAL THERAPY NOTE
PHYSICAL THERAPY EVALUATION - INPATIENT     Room Number: 528/528-A  Evaluation Date: 2/2/2020  Type of Evaluation: Initial        Presenting Problem: frequent falls, cutaneous candidiasis, influenza A  Reason for Therapy: Mobility Dysfunction and Discharg supports that patients with this level of impairment may benefit from BAM. Patient will benefit from continued IP PT services to address these deficits in preparation for discharge.     DISCHARGE RECOMMENDATIONS       PLAN  PT Treatment Plan: Bed mobilit Restriction: None                PAIN ASSESSMENT             COGNITION  · Orientation Level:  oriented to situation and oriented to person  · Following Commands:  follows one step commands with increased time and follows multistep commands with increased t transfers Sit to/from Stand at assistance level: supervision with walker - rolling     Goal #2  Current Status    Goal #3 Patient is able to ambulate 50 feet with assist device: walker - rolling at assistance level: supervision   Goal #3   Current Status

## 2020-02-02 NOTE — OCCUPATIONAL THERAPY NOTE
OCCUPATIONAL THERAPY EVALUATION - INPATIENT     Room Number: 528/528-A  Evaluation Date: 2/2/2020  Type of Evaluation: Initial  Presenting Problem: s/p fall, weakness    Physician Order: IP Consult to Occupational Therapy  Reason for Therapy: ADL/IADL Dysf Short Form. Research supports that patients with this level of impairment may benefit from subacute rehab. DISCHARGE RECOMMENDATIONS  OT Discharge Recommendations: Sub-acute rehabilitation  OT Device Recommendations: TBD    PLAN  OT Treatment Plan:  Abdelrahman assistance with her IADL. She does not drive. Recently, she has been having frequent fall at home.     SUBJECTIVE  \"I can't believe how weak I am!\"    OCCUPATIONAL THERAPY EXAMINATION      OBJECTIVE  Precautions: None(droplet)  Fall Risk: High fall risk A  Toileting: Total A  Upper Extremity Dressing: Min A  Lower Extremity Dressing:  Total A    Education Provided: role of OT, fall prevention, body mechanics  Patient End of Session: Up in chair;Needs met;Call light within reach;RN aware of session/findings

## 2020-02-02 NOTE — PROGRESS NOTES
[facility]    Progress Note.     Raul Cuellar Patient Status:  Observation    1922 MRN [id number]   Location [facility] 5SW/SE Attending Leann De La Torre MD   Hosp Day # 1 PCP Mahogany Anna MD     Date:  2020  Date of Admis Solution, Inject 40 Units into the skin every morning. Saline (DEEP SEA NASAL SPRAY NA), 2 sprays by Each Nare route 4 (four) times daily as needed. ibuprofen 200 MG Oral Tab, Take 200 mg by mouth every 4 (four) hours as needed for Pain.   Albuterol Sulfa normal and breath sounds normal.   Few rhonchi with cough. Abdominal: Soft. Bowel sounds are normal. She exhibits no distension. There is no tenderness. Musculoskeletal: Normal range of motion. Neurological: She is alert.  No cranial nerve deficit, se 2/01/2020 at 13:21     Approved by (CST): Caldwell Cushing, MD on 2/01/2020 at 13:22                Assessment/Plan:     PN (peripheral neuropathy)  Associated with long standing DM 2.       Cutaneous candidiasis  B/L groin fold intertrigo - topical Rx orde

## 2020-02-02 NOTE — PLAN OF CARE
Problem: Patient Centered Care  Goal: Patient preferences are identified and integrated in the patient's plan of care  Description  Interventions:  - What would you like us to know as we care for you? Pt is from [facility]. She has hx Dementia.   - Provide t call using call light for help when getting oob, hourly rounding by nursing staff.   Problem: DISCHARGE PLANNING  Goal: Discharge to home or other facility with appropriate resources  Description  INTERVENTIONS:  - Identify barriers to discharge w/pt and ca prevention bundle as indicated  Outcome: Progressing    Aquacel foam dressing intact to lt leg skin tear.   Problem: Impaired Functional Mobility  Goal: Achieve highest/safest level of mobility/gait  Description  Interventions:  - Assess patient's functiona

## 2020-02-03 LAB
GLUCOSE BLDC GLUCOMTR-MCNC: 130 MG/DL (ref 70–99)
GLUCOSE BLDC GLUCOMTR-MCNC: 150 MG/DL (ref 70–99)
GLUCOSE BLDC GLUCOMTR-MCNC: 193 MG/DL (ref 70–99)
GLUCOSE BLDC GLUCOMTR-MCNC: 88 MG/DL (ref 70–99)

## 2020-02-03 PROCEDURE — 82962 GLUCOSE BLOOD TEST: CPT

## 2020-02-03 NOTE — PROGRESS NOTES
[facility]    Progress Note.     Tolu Johnson Patient Status:  Observation    1922 MRN [id number]   Location [facility] [place] 5SW/SE Attending Khris Hitchcock MD   Hosp Day # 2 PCP Omar Arenas MD     Date:  2/3/2020  Date of Admis Solution, Inject 40 Units into the skin every morning. Saline (DEEP SEA NASAL SPRAY NA), 2 sprays by Each Nare route 4 (four) times daily as needed. ibuprofen 200 MG Oral Tab, Take 200 mg by mouth every 4 (four) hours as needed for Pain.   Albuterol Sulfa normal and breath sounds normal.   Few rhonchi with cough. Abdominal: Soft. Bowel sounds are normal. She exhibits no distension. There is no tenderness. Musculoskeletal: Normal range of motion. Neurological: She is alert.  No cranial nerve deficit, se compounded by dehydration and baseline cognitive deficits. Vascular dementia without behavioral disturbance (HCC)  Moderate cognitive deficits at baseline.         **Certification      PHYSICIAN Certification of Need for Inpatient Hospitalization - Ini

## 2020-02-03 NOTE — CM/SW NOTE
Update 2/4/20 @ 65  JONATHON spoke to pt's dtr [facility] regarding placement and [facility]. [facility] is agreeable with pt placement. JONATHON  spoke to pt's dtr Thiago Aranda ([phone]) re d/c planning.   Dtr provided choices:    1) [facility]  2)

## 2020-02-03 NOTE — PLAN OF CARE
Problem: Patient Centered Care  Goal: Patient preferences are identified and integrated in the patient's plan of care  Description  Interventions:  - What would you like us to know as we care for you? Pt is from [facility]. She has hx Dementia.   - Provide t home or other facility with appropriate resources  Description  INTERVENTIONS:  - Identify barriers to discharge w/pt and caregiver  - Include patient/family/discharge partner in discharge planning  - Arrange for needed discharge resources and transportati patient/family in tolerated activity level and precautions  Outcome: Progressing     Problem: NEUROLOGICAL - ADULT  Goal: Achieves stable or improved neurological status  Description  INTERVENTIONS  - Assess for and report changes in neurological status  -

## 2020-02-03 NOTE — PLAN OF CARE
Pt Alert, RA. Pt denies pain, sob, chest pain. N/V. Medication reviewed. Safety maintained. Call light in reach. We will continue to monitor.   Problem: Patient Centered Care  Goal: Patient preferences are identified and integrated in the patient's plan of assist with strengthening/mobility  - Encourage toileting schedule  Outcome: Progressing     Problem: DISCHARGE PLANNING  Goal: Discharge to home or other facility with appropriate resources  Description  INTERVENTIONS:  - Identify barriers to discharge w/ patient's functional ability and stability  - Promote increasing activity/tolerance for mobility and gait  - Educate and engage patient/family in tolerated activity level and precautions  - Recommend use of sit-stand lift for transfers  Outcome: Progressin

## 2020-02-04 LAB
ANION GAP SERPL CALC-SCNC: 4 MMOL/L (ref 0–18)
BUN BLD-MCNC: 12 MG/DL (ref 7–18)
BUN/CREAT SERPL: 13 (ref 10–20)
CALCIUM BLD-MCNC: 8.5 MG/DL (ref 8.5–10.1)
CHLORIDE SERPL-SCNC: 111 MMOL/L (ref 98–112)
CO2 SERPL-SCNC: 28 MMOL/L (ref 21–32)
CREAT BLD-MCNC: 0.92 MG/DL (ref 0.55–1.02)
GLUCOSE BLD-MCNC: 143 MG/DL (ref 70–99)
GLUCOSE BLDC GLUCOMTR-MCNC: 134 MG/DL (ref 70–99)
GLUCOSE BLDC GLUCOMTR-MCNC: 154 MG/DL (ref 70–99)
GLUCOSE BLDC GLUCOMTR-MCNC: 226 MG/DL (ref 70–99)
GLUCOSE BLDC GLUCOMTR-MCNC: 261 MG/DL (ref 70–99)
OSMOLALITY SERPL CALC.SUM OF ELEC: 298 MOSM/KG (ref 275–295)
POTASSIUM SERPL-SCNC: 4 MMOL/L (ref 3.5–5.1)
SODIUM SERPL-SCNC: 143 MMOL/L (ref 136–145)

## 2020-02-04 PROCEDURE — 80048 BASIC METABOLIC PNL TOTAL CA: CPT | Performed by: INTERNAL MEDICINE

## 2020-02-04 PROCEDURE — 82962 GLUCOSE BLOOD TEST: CPT

## 2020-02-04 PROCEDURE — 97110 THERAPEUTIC EXERCISES: CPT

## 2020-02-04 PROCEDURE — 97530 THERAPEUTIC ACTIVITIES: CPT

## 2020-02-04 PROCEDURE — 97116 GAIT TRAINING THERAPY: CPT

## 2020-02-04 NOTE — PROGRESS NOTES
[facility]    Progress Note.     Zoë Orts Patient Status:  Observation    1922 MRN [id number]   Location [facility] 5SW/SE Attending Riya Storey MD   Hosp Day # 3 PCP Artur Rosado MD     Date:  2020  Date of Admis Solution, Inject 40 Units into the skin every morning. Saline (DEEP SEA NASAL SPRAY NA), 2 sprays by Each Nare route 4 (four) times daily as needed. ibuprofen 200 MG Oral Tab, Take 200 mg by mouth every 4 (four) hours as needed for Pain.   Albuterol Sulfa normal and breath sounds normal.   Few rhonchi with cough. Abdominal: Soft. Bowel sounds are normal. She exhibits no distension. There is no tenderness. Musculoskeletal: Normal range of motion. Neurological: She is alert.  No cranial nerve deficit, se encephalopathy  Probably influenza-related and compounded by dehydration and baseline cognitive deficits. Vascular dementia without behavioral disturbance (HCC)  Moderate cognitive deficits at baseline.         **Certification      PHYSICIAN Certificat

## 2020-02-04 NOTE — OCCUPATIONAL THERAPY NOTE
OCCUPATIONAL THERAPY TREATMENT NOTE - INPATIENT        Room Number: 528/528-A     Presenting Problem: s/p fall, weakness    Problem List  Principal Problem:    Frequent falls  Active Problems:    PN (peripheral neuropathy)    Cutaneous candidiasis    Traum DISCHARGE RECOMMENDATIONS  OT Discharge Recommendations: Sub-acute rehabilitation  OT Device Recommendations: TBD     PLAN  OT Treatment Plan: Balance activities; Energy conservation/work simplification techniques;ADL training;IADL training;Functional t Goals:  Patients self stated goal is: to get stronger      Patient will complete functional transfer with Mod A  Comment: progressing pt completed sit <> stand from bedside chair with mod A    Patient will complete toileting with Mod A  Comment: ANI Alamo

## 2020-02-04 NOTE — PLAN OF CARE
Problem: Patient Centered Care  Goal: Patient preferences are identified and integrated in the patient's plan of care  Description  Interventions:  - What would you like us to know as we care for you? Pt is from [facility]. She has hx Dementia.   - Provide t home or other facility with appropriate resources  Description  INTERVENTIONS:  - Identify barriers to discharge w/pt and caregiver  - Include patient/family/discharge partner in discharge planning  - Arrange for needed discharge resources and transportati patient/family in tolerated activity level and precautions  Outcome: Progressing     Problem: NEUROLOGICAL - ADULT  Goal: Achieves stable or improved neurological status  Description  INTERVENTIONS  - Assess for and report changes in neurological status  -

## 2020-02-04 NOTE — PHYSICAL THERAPY NOTE
PHYSICAL THERAPY TREATMENT NOTE - INPATIENT     Room Number: 528/528-A       Presenting Problem: frequent falls, cutaneous candidiasis, influenza A    Problem List  Active Problems:    PN (peripheral neuropathy)    Cutaneous candidiasis    Traumatic rhabdo listen! \"    OBJECTIVE  Precautions: None(droplet)    WEIGHT BEARING RESTRICTION  Weight Bearing Restriction: None                PAIN ASSESSMENT   Ratin          BALANCE 50 feet with assist device: walker - rolling at assistance level: supervision   Goal #3   Current Status 20ft with rolling walker with mod A x 1    Goal #4 Pt will complete 10-15 minutes of appropriate exercises in her hospital room.     Goal #4   Current S

## 2020-02-04 NOTE — PLAN OF CARE
Problem: Patient Centered Care  Goal: Patient preferences are identified and integrated in the patient's plan of care  Description  Interventions:  - What would you like us to know as we care for you? Pt is from [facility]. She has hx Dementia.   - Provide t home or other facility with appropriate resources  Description  INTERVENTIONS:  - Identify barriers to discharge w/pt and caregiver  - Include patient/family/discharge partner in discharge planning  - Arrange for needed discharge resources and transportati patient/family in tolerated activity level and precautions    Outcome: Progressing     Problem: NEUROLOGICAL - ADULT  Goal: Achieves stable or improved neurological status  Description  INTERVENTIONS  - Assess for and report changes in neurological status

## 2020-02-05 ENCOUNTER — EXTERNAL FACILITY (OUTPATIENT)
Dept: FAMILY MEDICINE CLINIC | Facility: CLINIC | Age: 85
End: 2020-02-05

## 2020-02-05 VITALS
HEART RATE: 97 BPM | OXYGEN SATURATION: 96 % | BODY MASS INDEX: 33.02 KG/M2 | SYSTOLIC BLOOD PRESSURE: 140 MMHG | HEIGHT: 63 IN | WEIGHT: 186.38 LBS | RESPIRATION RATE: 18 BRPM | TEMPERATURE: 98 F | DIASTOLIC BLOOD PRESSURE: 45 MMHG

## 2020-02-05 DIAGNOSIS — Z79.4 TYPE 2 DIABETES MELLITUS WITH DIABETIC POLYNEUROPATHY, WITH LONG-TERM CURRENT USE OF INSULIN (HCC): ICD-10-CM

## 2020-02-05 DIAGNOSIS — R29.6 FREQUENT FALLS: ICD-10-CM

## 2020-02-05 DIAGNOSIS — F01.50 VASCULAR DEMENTIA WITHOUT BEHAVIORAL DISTURBANCE (HCC): ICD-10-CM

## 2020-02-05 DIAGNOSIS — T79.6XXS TRAUMATIC RHABDOMYOLYSIS, SEQUELA: ICD-10-CM

## 2020-02-05 DIAGNOSIS — B36.9 FUNGAL DERMATITIS: ICD-10-CM

## 2020-02-05 DIAGNOSIS — E11.42 TYPE 2 DIABETES MELLITUS WITH DIABETIC POLYNEUROPATHY, WITH LONG-TERM CURRENT USE OF INSULIN (HCC): ICD-10-CM

## 2020-02-05 DIAGNOSIS — R53.1 GENERALIZED WEAKNESS: Primary | ICD-10-CM

## 2020-02-05 DIAGNOSIS — J10.1 INFLUENZA A: ICD-10-CM

## 2020-02-05 LAB
GLUCOSE BLDC GLUCOMTR-MCNC: 137 MG/DL (ref 70–99)
GLUCOSE BLDC GLUCOMTR-MCNC: 140 MG/DL (ref 70–99)

## 2020-02-05 PROCEDURE — 99306 1ST NF CARE HIGH MDM 50: CPT | Performed by: FAMILY MEDICINE

## 2020-02-05 PROCEDURE — 82962 GLUCOSE BLOOD TEST: CPT

## 2020-02-05 RX ORDER — IBUPROFEN 200 MG
200 TABLET ORAL EVERY 8 HOURS PRN
Qty: 50 TABLET | Refills: 3 | Status: SHIPPED | OUTPATIENT
Start: 2020-02-05

## 2020-02-05 RX ORDER — ACETAMINOPHEN 325 MG/1
650 TABLET ORAL EVERY 6 HOURS PRN
Qty: 100 TABLET | Refills: 3 | Status: SHIPPED | OUTPATIENT
Start: 2020-02-05

## 2020-02-05 NOTE — CM/SW NOTE
02/05/20 1000   Discharge disposition   Expected discharge disposition Skilled Nurs   Name of Sana [address] at [place]   Patient is Discharged to a [address] [place] Yes   Discharge transportation [employer]  (3pm per [employer]

## 2020-02-05 NOTE — PLAN OF CARE
Problem: Patient Centered Care  Goal: Patient preferences are identified and integrated in the patient's plan of care  Description  Interventions:  - What would you like us to know as we care for you? Pt is from [facility]. She has hx Dementia.   - Provide t home or other facility with appropriate resources  Description  INTERVENTIONS:  - Identify barriers to discharge w/pt and caregiver  - Include patient/family/discharge partner in discharge planning  - Arrange for needed discharge resources and transportati patient/family in tolerated activity level and precautions    Outcome: Progressing     Problem: Impaired Functional Mobility  Goal: Achieve highest/safest level of mobility/gait  Description  Interventions:  - Assess patient's functional ability and stabil

## 2020-02-05 NOTE — PLAN OF CARE
Report called to Сергей Hernandez from [address] [place] [address]. Per request, med rec faxed. Awaiting ambulance transport to NH.

## 2020-02-05 NOTE — PROGRESS NOTES
[facility]    Progress Note.     Azeem Gregorio Patient Status:  Observation    1922 MRN [id number]   Location [facility] 5SW/SE Attending China Gurrola MD   Hosp Day # 4 PCP Nicanor Prado MD     Date:  2020  Date of Admis Solution, Inject 40 Units into the skin every morning. Saline (DEEP SEA NASAL SPRAY NA), 2 sprays by Each Nare route 4 (four) times daily as needed. ibuprofen 200 MG Oral Tab, Take 200 mg by mouth every 4 (four) hours as needed for Pain.   Albuterol Sulfa Effort normal and breath sounds normal.   Few rhonchi with cough. Abdominal: Soft. Bowel sounds are normal. She exhibits no distension. There is no tenderness. Musculoskeletal: Normal range of motion. Neurological: She is alert.  No cranial nerve defi Tamiflu completed. Pt received Tamiflu for 2 days PTA. Acute metabolic encephalopathy  Probably influenza-related and compounded by dehydration and baseline cognitive deficits.       Vascular dementia without behavioral disturbance (HCC)  Moderate cogn

## 2020-02-05 NOTE — PLAN OF CARE
Problem: Patient Centered Care  Goal: Patient preferences are identified and integrated in the patient's plan of care  Description  Interventions:  - What would you like us to know as we care for you? Pt is from [facility]. She has hx Dementia.   - Provide t home or other facility with appropriate resources  Description  INTERVENTIONS:  - Identify barriers to discharge w/pt and caregiver  - Include patient/family/discharge partner in discharge planning  - Arrange for needed discharge resources and transportati

## 2020-02-06 ENCOUNTER — NURSE ONLY (OUTPATIENT)
Dept: LAB | Age: 85
End: 2020-02-06
Attending: FAMILY MEDICINE
Payer: MEDICARE

## 2020-02-06 ENCOUNTER — INITIAL APN SNF VISIT (OUTPATIENT)
Dept: INTERNAL MEDICINE CLINIC | Age: 85
End: 2020-02-06

## 2020-02-06 VITALS
SYSTOLIC BLOOD PRESSURE: 148 MMHG | HEART RATE: 77 BPM | TEMPERATURE: 97 F | OXYGEN SATURATION: 94 % | RESPIRATION RATE: 18 BRPM | DIASTOLIC BLOOD PRESSURE: 69 MMHG

## 2020-02-06 DIAGNOSIS — E11.42 DIABETIC POLYNEUROPATHY (HCC): Primary | ICD-10-CM

## 2020-02-06 DIAGNOSIS — N17.9 PRERENAL ACUTE RENAL FAILURE (HCC): ICD-10-CM

## 2020-02-06 DIAGNOSIS — Z79.4 TYPE 2 DIABETES MELLITUS WITH DIABETIC POLYNEUROPATHY, WITH LONG-TERM CURRENT USE OF INSULIN (HCC): ICD-10-CM

## 2020-02-06 DIAGNOSIS — F01.50 VASCULAR DEMENTIA WITHOUT BEHAVIORAL DISTURBANCE (HCC): ICD-10-CM

## 2020-02-06 DIAGNOSIS — J10.1 INFLUENZA A: Primary | ICD-10-CM

## 2020-02-06 DIAGNOSIS — E11.42 TYPE 2 DIABETES MELLITUS WITH DIABETIC POLYNEUROPATHY, WITH LONG-TERM CURRENT USE OF INSULIN (HCC): ICD-10-CM

## 2020-02-06 DIAGNOSIS — R53.1 WEAKNESS GENERALIZED: ICD-10-CM

## 2020-02-06 DIAGNOSIS — R29.6 FREQUENT FALLS: ICD-10-CM

## 2020-02-06 DIAGNOSIS — G93.41 ACUTE METABOLIC ENCEPHALOPATHY: ICD-10-CM

## 2020-02-06 DIAGNOSIS — B37.2 CUTANEOUS CANDIDIASIS: ICD-10-CM

## 2020-02-06 LAB
ALBUMIN SERPL-MCNC: 3 G/DL (ref 3.4–5)
ALBUMIN/GLOB SERPL: 0.8 {RATIO} (ref 1–2)
ALP LIVER SERPL-CCNC: 98 U/L (ref 55–142)
ALT SERPL-CCNC: 40 U/L (ref 13–56)
ANION GAP SERPL CALC-SCNC: 3 MMOL/L (ref 0–18)
AST SERPL-CCNC: 43 U/L (ref 15–37)
BASOPHILS # BLD AUTO: 0.02 X10(3) UL (ref 0–0.2)
BASOPHILS NFR BLD AUTO: 0.6 %
BILIRUB SERPL-MCNC: 0.6 MG/DL (ref 0.1–2)
BUN BLD-MCNC: 15 MG/DL (ref 7–18)
BUN/CREAT SERPL: 15.6 (ref 10–20)
CALCIUM BLD-MCNC: 8.7 MG/DL (ref 8.5–10.1)
CHLORIDE SERPL-SCNC: 107 MMOL/L (ref 98–112)
CO2 SERPL-SCNC: 28 MMOL/L (ref 21–32)
CREAT BLD-MCNC: 0.96 MG/DL (ref 0.55–1.02)
DEPRECATED RDW RBC AUTO: 44.9 FL (ref 35.1–46.3)
EOSINOPHIL # BLD AUTO: 0.18 X10(3) UL (ref 0–0.7)
EOSINOPHIL NFR BLD AUTO: 5.3 %
ERYTHROCYTE [DISTWIDTH] IN BLOOD BY AUTOMATED COUNT: 12.8 % (ref 11–15)
EST. AVERAGE GLUCOSE BLD GHB EST-MCNC: 146 MG/DL (ref 68–126)
GLOBULIN PLAS-MCNC: 3.7 G/DL (ref 2.8–4.4)
GLUCOSE BLD-MCNC: 200 MG/DL (ref 70–99)
HAV IGM SER QL: 1.9 MG/DL (ref 1.6–2.6)
HBA1C MFR BLD HPLC: 6.7 % (ref ?–5.7)
HCT VFR BLD AUTO: 38 % (ref 35–48)
HGB BLD-MCNC: 12.2 G/DL (ref 12–16)
IMM GRANULOCYTES # BLD AUTO: 0.05 X10(3) UL (ref 0–1)
IMM GRANULOCYTES NFR BLD: 1.5 %
LYMPHOCYTES # BLD AUTO: 1.41 X10(3) UL (ref 1–4)
LYMPHOCYTES NFR BLD AUTO: 41.3 %
M PROTEIN MFR SERPL ELPH: 6.7 G/DL (ref 6.4–8.2)
MCH RBC QN AUTO: 30.7 PG (ref 26–34)
MCHC RBC AUTO-ENTMCNC: 32.1 G/DL (ref 31–37)
MCV RBC AUTO: 95.7 FL (ref 80–100)
MONOCYTES # BLD AUTO: 0.53 X10(3) UL (ref 0.1–1)
MONOCYTES NFR BLD AUTO: 15.5 %
NEUTROPHILS # BLD AUTO: 1.22 X10 (3) UL (ref 1.5–7.7)
NEUTROPHILS # BLD AUTO: 1.22 X10(3) UL (ref 1.5–7.7)
NEUTROPHILS NFR BLD AUTO: 35.8 %
OSMOLALITY SERPL CALC.SUM OF ELEC: 292 MOSM/KG (ref 275–295)
PATIENT FASTING Y/N/NP: YES
PLATELET # BLD AUTO: 166 10(3)UL (ref 150–450)
POTASSIUM SERPL-SCNC: 4.1 MMOL/L (ref 3.5–5.1)
RBC # BLD AUTO: 3.97 X10(6)UL (ref 3.8–5.3)
SODIUM SERPL-SCNC: 138 MMOL/L (ref 136–145)
VIT D+METAB SERPL-MCNC: 26.4 NG/ML (ref 30–100)
WBC # BLD AUTO: 3.4 X10(3) UL (ref 4–11)

## 2020-02-06 PROCEDURE — 99309 SBSQ NF CARE MODERATE MDM 30: CPT | Performed by: NURSE PRACTITIONER

## 2020-02-06 PROCEDURE — 83735 ASSAY OF MAGNESIUM: CPT

## 2020-02-06 PROCEDURE — 85025 COMPLETE CBC W/AUTO DIFF WBC: CPT

## 2020-02-06 PROCEDURE — 80053 COMPREHEN METABOLIC PANEL: CPT

## 2020-02-06 PROCEDURE — 83036 HEMOGLOBIN GLYCOSYLATED A1C: CPT

## 2020-02-06 PROCEDURE — 82306 VITAMIN D 25 HYDROXY: CPT

## 2020-02-06 NOTE — PROGRESS NOTES
Ca Ruvalcaba Author: Mickey Peña MD     1922 MRN [id number]   [facility]  Admission 20      Last Hospital Discharge 20 PCP Angie Ibanez [facility] Discharge  [facility]        CC -admitted to [place] at [facility] from Smokeless tobacco: Never Used    Alcohol use: No    Drug use: No      ALLERGIES:    Morphine                    CODE STATUS:  DNR    CURRENT MEDICATIONS   Current Outpatient Medications   Medication Sig Dispense Refill   • ibuprofen 200 MG Oral Tab Take 1 Soln Inhale 2 puffs into the lungs every 4 (four) hours as needed for Wheezing. • Sennosides-Docusate Sodium (SENEXON-S) 8.6-50 MG Oral Tab Take 1 tablet by mouth daily as needed for constipation.      • Sertraline HCl (ZOLOFT) 50 MG Oral Tab 1TABLET BY intact. Sensation to light touch is intact bilaterally. Motor:  No arm or leg weakness noted. Finger-to-nose coordination is intact.   Gait deferred    DIAGNOSTICS REVIEWED AT THIS VISIT:    ASSESSMENT AND PLAN:  Diagnoses and all orders for this visit:

## 2020-02-06 NOTE — PROGRESS NOTES
Tolu Johnson  : 1922  Age 80year old  female patient is admitted to Facility: The [address] [place] [address] for [address] date:  2020  Discharge date to [facility]:  2020  ELOS:  14 days  Anticipated discharge date:  TB without mention of complication, not stated as uncontrolled      No past surgical history on file.   Family History   Problem Relation Age of Onset   • Other (Other) Father         renal disease   • Other (Other) Mother         arthritis     Social History Glycol 3350 (MIRALAX OR) Take 17 g by mouth daily as needed. • Insulin Lispro 100 UNIT/ML Subcutaneous Solution Inject 8 Units into the skin daily before lunch.      • insulin glargine 100 UNIT/ML Subcutaneous Solution Inject 40 Units into the skin ever drainage from eyes  HENT: normocephalic; normal nose, no nasal drainage, mucous membranes pink, moist.  NECK: supple, non tender, FROM  BREAST: deferred  RESPIRATORY:clear, no crackles, no wheezing, no dyspnea, no cough, room air  CARDIOVASCULAR: RRR, S1 a reviewed. Medication reconciliation completed.         SEE PLAN BELOW  Falls, weakness, ADL dysfunction 2/2 influenza infection, peripheral neuropathy  Safety monitoring with falls and weakness  PT/OT eval and treat  ELOS 14 days  DC TBD with rehab and SW

## 2020-02-11 ENCOUNTER — SNF VISIT (OUTPATIENT)
Dept: INTERNAL MEDICINE CLINIC | Age: 85
End: 2020-02-11

## 2020-02-11 VITALS
OXYGEN SATURATION: 97 % | RESPIRATION RATE: 20 BRPM | DIASTOLIC BLOOD PRESSURE: 62 MMHG | TEMPERATURE: 97 F | BODY MASS INDEX: 33 KG/M2 | WEIGHT: 186.88 LBS | SYSTOLIC BLOOD PRESSURE: 148 MMHG | HEART RATE: 85 BPM

## 2020-02-11 DIAGNOSIS — G89.29 CHRONIC PAIN OF BOTH SHOULDERS: ICD-10-CM

## 2020-02-11 DIAGNOSIS — G93.41 ACUTE METABOLIC ENCEPHALOPATHY: ICD-10-CM

## 2020-02-11 DIAGNOSIS — M25.511 CHRONIC PAIN OF BOTH SHOULDERS: ICD-10-CM

## 2020-02-11 DIAGNOSIS — J10.1 INFLUENZA A: ICD-10-CM

## 2020-02-11 DIAGNOSIS — M25.512 CHRONIC PAIN OF BOTH SHOULDERS: ICD-10-CM

## 2020-02-11 DIAGNOSIS — R29.6 FREQUENT FALLS: Primary | ICD-10-CM

## 2020-02-11 PROCEDURE — 99308 SBSQ NF CARE LOW MDM 20: CPT | Performed by: NURSE PRACTITIONER

## 2020-02-11 NOTE — PROGRESS NOTES
Sal Brochure, 92/1922, 80year old, female    Chief Complaint:  Patient presents with: Follow - Up: falls, influenza, rhabado, azotemia, ARF.  DM 2, vascular dementia  Shoulder Pain       Subjective:    PMH significant for Dementia, Depression, T2 DM w/ synovitis upper or lower extremity  EXTREMITIES/VASCULAR:radial pulses 2+ and dorsalis pedal pulses 2+  NEUROLOGIC: Alert, oriented to self, some events, not time or place.  no focal deficits, follow commands  PSYCHIATRIC: calm, cooperative, mood and affect

## 2020-02-13 ENCOUNTER — NURSE ONLY (OUTPATIENT)
Dept: LAB | Age: 85
End: 2020-02-13
Attending: FAMILY MEDICINE
Payer: MEDICARE

## 2020-02-13 ENCOUNTER — SNF VISIT (OUTPATIENT)
Dept: INTERNAL MEDICINE CLINIC | Age: 85
End: 2020-02-13

## 2020-02-13 VITALS
DIASTOLIC BLOOD PRESSURE: 78 MMHG | WEIGHT: 186.88 LBS | OXYGEN SATURATION: 97 % | TEMPERATURE: 98 F | SYSTOLIC BLOOD PRESSURE: 157 MMHG | HEART RATE: 105 BPM | RESPIRATION RATE: 18 BRPM | BODY MASS INDEX: 33 KG/M2

## 2020-02-13 DIAGNOSIS — R29.6 FREQUENT FALLS: Primary | ICD-10-CM

## 2020-02-13 DIAGNOSIS — J10.1 INFLUENZA A: ICD-10-CM

## 2020-02-13 DIAGNOSIS — E11.42 TYPE 2 DIABETES MELLITUS WITH DIABETIC POLYNEUROPATHY, WITH LONG-TERM CURRENT USE OF INSULIN (HCC): ICD-10-CM

## 2020-02-13 DIAGNOSIS — F01.50 VASCULAR DEMENTIA WITHOUT BEHAVIORAL DISTURBANCE (HCC): ICD-10-CM

## 2020-02-13 DIAGNOSIS — R53.1 WEAKNESS GENERALIZED: ICD-10-CM

## 2020-02-13 DIAGNOSIS — E56.9 VITAMIN DISEASE: Primary | ICD-10-CM

## 2020-02-13 DIAGNOSIS — Z79.4 TYPE 2 DIABETES MELLITUS WITH DIABETIC POLYNEUROPATHY, WITH LONG-TERM CURRENT USE OF INSULIN (HCC): ICD-10-CM

## 2020-02-13 LAB
ALBUMIN SERPL-MCNC: 3.3 G/DL (ref 3.4–5)
ALBUMIN/GLOB SERPL: 0.9 {RATIO} (ref 1–2)
ALP LIVER SERPL-CCNC: 118 U/L (ref 55–142)
ALT SERPL-CCNC: 31 U/L (ref 13–56)
ANION GAP SERPL CALC-SCNC: 6 MMOL/L (ref 0–18)
AST SERPL-CCNC: 22 U/L (ref 15–37)
BASOPHILS # BLD AUTO: 0.06 X10(3) UL (ref 0–0.2)
BASOPHILS NFR BLD AUTO: 0.8 %
BILIRUB SERPL-MCNC: 0.6 MG/DL (ref 0.1–2)
BUN BLD-MCNC: 20 MG/DL (ref 7–18)
BUN/CREAT SERPL: 19 (ref 10–20)
CALCIUM BLD-MCNC: 8.7 MG/DL (ref 8.5–10.1)
CHLORIDE SERPL-SCNC: 106 MMOL/L (ref 98–112)
CO2 SERPL-SCNC: 26 MMOL/L (ref 21–32)
CREAT BLD-MCNC: 1.05 MG/DL (ref 0.55–1.02)
DEPRECATED RDW RBC AUTO: 47.5 FL (ref 35.1–46.3)
EOSINOPHIL # BLD AUTO: 0.18 X10(3) UL (ref 0–0.7)
EOSINOPHIL NFR BLD AUTO: 2.3 %
ERYTHROCYTE [DISTWIDTH] IN BLOOD BY AUTOMATED COUNT: 13.4 % (ref 11–15)
GLOBULIN PLAS-MCNC: 3.7 G/DL (ref 2.8–4.4)
GLUCOSE BLD-MCNC: 132 MG/DL (ref 70–99)
HCT VFR BLD AUTO: 40.2 % (ref 35–48)
HGB BLD-MCNC: 13.1 G/DL (ref 12–16)
IMM GRANULOCYTES # BLD AUTO: 0.08 X10(3) UL (ref 0–1)
IMM GRANULOCYTES NFR BLD: 1 %
LYMPHOCYTES # BLD AUTO: 1.49 X10(3) UL (ref 1–4)
LYMPHOCYTES NFR BLD AUTO: 18.8 %
M PROTEIN MFR SERPL ELPH: 7 G/DL (ref 6.4–8.2)
MCH RBC QN AUTO: 31.3 PG (ref 26–34)
MCHC RBC AUTO-ENTMCNC: 32.6 G/DL (ref 31–37)
MCV RBC AUTO: 96.2 FL (ref 80–100)
MONOCYTES # BLD AUTO: 0.89 X10(3) UL (ref 0.1–1)
MONOCYTES NFR BLD AUTO: 11.3 %
NEUTROPHILS # BLD AUTO: 5.21 X10 (3) UL (ref 1.5–7.7)
NEUTROPHILS # BLD AUTO: 5.21 X10(3) UL (ref 1.5–7.7)
NEUTROPHILS NFR BLD AUTO: 65.8 %
OSMOLALITY SERPL CALC.SUM OF ELEC: 290 MOSM/KG (ref 275–295)
PATIENT FASTING Y/N/NP: YES
PLATELET # BLD AUTO: 252 10(3)UL (ref 150–450)
POTASSIUM SERPL-SCNC: 4 MMOL/L (ref 3.5–5.1)
RBC # BLD AUTO: 4.18 X10(6)UL (ref 3.8–5.3)
SODIUM SERPL-SCNC: 138 MMOL/L (ref 136–145)
WBC # BLD AUTO: 7.9 X10(3) UL (ref 4–11)

## 2020-02-13 PROCEDURE — 99309 SBSQ NF CARE MODERATE MDM 30: CPT | Performed by: NURSE PRACTITIONER

## 2020-02-13 PROCEDURE — 85025 COMPLETE CBC W/AUTO DIFF WBC: CPT

## 2020-02-13 PROCEDURE — 80053 COMPREHEN METABOLIC PANEL: CPT

## 2020-02-13 NOTE — PROGRESS NOTES
Dada Chester, 92/1922, 80year old, female    Chief Complaint:  Patient presents with: Follow - Up: falls, influenza, rhabado, azotemia, ARF.  DM 2, vascular dementia  Lab Results  Weakness       Subjective:    PMH significant for Dementia, Depression, events, not time or place. no focal deficits, follows commands, confused.    PSYCHIATRIC: calm, cooperative, mood and affect appropriate to situation     Medications reviewed: Yes    Diagnostics reviewed:       Lab Results   Component Value Date    WBC 7.9 BID  - monitoring     Tramatic injuries  - bilateral knee and LLE abrasions  - cleaned and dressed  - pain is minimal  - Tylenol/ibuprofen prn     Influenza A  - tamiflu completed  - monitoring     Vascular dementia with acute metabolic encephalopathy and

## 2020-02-18 ENCOUNTER — NURSE ONLY (OUTPATIENT)
Dept: LAB | Age: 85
End: 2020-02-18
Attending: FAMILY MEDICINE
Payer: MEDICARE

## 2020-02-18 ENCOUNTER — SNF VISIT (OUTPATIENT)
Dept: INTERNAL MEDICINE CLINIC | Age: 85
End: 2020-02-18

## 2020-02-18 VITALS
DIASTOLIC BLOOD PRESSURE: 73 MMHG | OXYGEN SATURATION: 97 % | TEMPERATURE: 97 F | RESPIRATION RATE: 18 BRPM | SYSTOLIC BLOOD PRESSURE: 129 MMHG | HEART RATE: 67 BPM

## 2020-02-18 DIAGNOSIS — R29.6 FREQUENT FALLS: Primary | ICD-10-CM

## 2020-02-18 DIAGNOSIS — R22.41 LOWER LEG MASS, RIGHT: ICD-10-CM

## 2020-02-18 DIAGNOSIS — L03.116 CELLULITIS OF LEFT ANTERIOR LOWER LEG: ICD-10-CM

## 2020-02-18 DIAGNOSIS — E56.9 VITAMIN DISEASE: Primary | ICD-10-CM

## 2020-02-18 PROCEDURE — 99310 SBSQ NF CARE HIGH MDM 45: CPT | Performed by: NURSE PRACTITIONER

## 2020-02-18 RX ORDER — CEPHALEXIN 500 MG/1
500 CAPSULE ORAL 3 TIMES DAILY
COMMUNITY
Start: 2020-02-18 | End: 2020-02-25

## 2020-02-18 NOTE — PROGRESS NOTES
Janessa Landon, 92/1922, 80year old, female    Chief Complaint:  Patient presents with: Follow - Up: falls, influenza, rhabado, azotemia, ARF.  DM 2, vascular dementia  Leg Pain: right  Derm Problem: red and tender on LLE       Subjective:    PMH signifi lymphedema  MUSCULOSKELETAL: no acute synovitis upper or lower extremity  EXTREMITIES/VASCULAR:radial pulses 2+ and dorsalis pedal pulses 2+  Lateral RLE at mid-fibula w/ ~2.5 cm diameter, round, raised, skin colored mass, not painful to palpation, firm ma for 8 weeks  - glucosamine BID  -Voltaren 1% gel; 2 gm to b/l shoulders TID     DVT prophylaxis  None, early ambulation     GI prophylaxis  none     Labs  CBC, CMP weekly     Follow Ups:  PCP within 7 days of [address]  2/18/2020  11:50  AM

## 2020-02-20 ENCOUNTER — NURSE ONLY (OUTPATIENT)
Dept: LAB | Age: 85
End: 2020-02-20
Attending: FAMILY MEDICINE
Payer: MEDICARE

## 2020-02-20 DIAGNOSIS — E56.9 VITAMIN DISEASE: ICD-10-CM

## 2020-02-20 LAB
ALBUMIN SERPL-MCNC: 3.2 G/DL (ref 3.4–5)
ALBUMIN/GLOB SERPL: 0.8 {RATIO} (ref 1–2)
ALP LIVER SERPL-CCNC: 128 U/L (ref 55–142)
ALT SERPL-CCNC: 26 U/L (ref 13–56)
ANION GAP SERPL CALC-SCNC: 5 MMOL/L (ref 0–18)
AST SERPL-CCNC: 24 U/L (ref 15–37)
BASOPHILS # BLD AUTO: 0.04 X10(3) UL (ref 0–0.2)
BASOPHILS NFR BLD AUTO: 0.7 %
BILIRUB SERPL-MCNC: 0.3 MG/DL (ref 0.1–2)
BUN BLD-MCNC: 27 MG/DL (ref 7–18)
BUN/CREAT SERPL: 25 (ref 10–20)
CALCIUM BLD-MCNC: 8.8 MG/DL (ref 8.5–10.1)
CHLORIDE SERPL-SCNC: 110 MMOL/L (ref 98–112)
CO2 SERPL-SCNC: 26 MMOL/L (ref 21–32)
CREAT BLD-MCNC: 1.08 MG/DL (ref 0.55–1.02)
DEPRECATED RDW RBC AUTO: 49.5 FL (ref 35.1–46.3)
EOSINOPHIL # BLD AUTO: 0.18 X10(3) UL (ref 0–0.7)
EOSINOPHIL NFR BLD AUTO: 3 %
ERYTHROCYTE [DISTWIDTH] IN BLOOD BY AUTOMATED COUNT: 13.4 % (ref 11–15)
GLOBULIN PLAS-MCNC: 4.2 G/DL (ref 2.8–4.4)
GLUCOSE BLD-MCNC: 53 MG/DL (ref 70–99)
HCT VFR BLD AUTO: 43 % (ref 35–48)
HGB BLD-MCNC: 13.5 G/DL (ref 12–16)
IMM GRANULOCYTES # BLD AUTO: 0.04 X10(3) UL (ref 0–1)
IMM GRANULOCYTES NFR BLD: 0.7 %
LYMPHOCYTES # BLD AUTO: 1.85 X10(3) UL (ref 1–4)
LYMPHOCYTES NFR BLD AUTO: 31 %
M PROTEIN MFR SERPL ELPH: 7.4 G/DL (ref 6.4–8.2)
MCH RBC QN AUTO: 31.5 PG (ref 26–34)
MCHC RBC AUTO-ENTMCNC: 31.4 G/DL (ref 31–37)
MCV RBC AUTO: 100.2 FL (ref 80–100)
MONOCYTES # BLD AUTO: 0.97 X10(3) UL (ref 0.1–1)
MONOCYTES NFR BLD AUTO: 16.2 %
NEUTROPHILS # BLD AUTO: 2.89 X10 (3) UL (ref 1.5–7.7)
NEUTROPHILS # BLD AUTO: 2.89 X10(3) UL (ref 1.5–7.7)
NEUTROPHILS NFR BLD AUTO: 48.4 %
OSMOLALITY SERPL CALC.SUM OF ELEC: 295 MOSM/KG (ref 275–295)
PATIENT FASTING Y/N/NP: YES
PLATELET # BLD AUTO: 230 10(3)UL (ref 150–450)
POTASSIUM SERPL-SCNC: 4.5 MMOL/L (ref 3.5–5.1)
RBC # BLD AUTO: 4.29 X10(6)UL (ref 3.8–5.3)
SODIUM SERPL-SCNC: 141 MMOL/L (ref 136–145)
WBC # BLD AUTO: 6 X10(3) UL (ref 4–11)

## 2020-02-20 PROCEDURE — 80053 COMPREHEN METABOLIC PANEL: CPT

## 2020-02-20 PROCEDURE — 85025 COMPLETE CBC W/AUTO DIFF WBC: CPT

## 2020-02-21 ENCOUNTER — SNF VISIT (OUTPATIENT)
Dept: INTERNAL MEDICINE CLINIC | Age: 85
End: 2020-02-21

## 2020-02-21 VITALS
WEIGHT: 186.19 LBS | HEART RATE: 68 BPM | TEMPERATURE: 98 F | OXYGEN SATURATION: 97 % | RESPIRATION RATE: 18 BRPM | DIASTOLIC BLOOD PRESSURE: 59 MMHG | BODY MASS INDEX: 33 KG/M2 | SYSTOLIC BLOOD PRESSURE: 157 MMHG

## 2020-02-21 DIAGNOSIS — S82.101D CLOSED FRACTURE OF PROXIMAL END OF RIGHT TIBIA WITH ROUTINE HEALING, UNSPECIFIED FRACTURE MORPHOLOGY, SUBSEQUENT ENCOUNTER: Primary | ICD-10-CM

## 2020-02-21 DIAGNOSIS — F01.51 VASCULAR DEMENTIA WITH BEHAVIOR DISTURBANCE (HCC): ICD-10-CM

## 2020-02-21 DIAGNOSIS — E11.42 TYPE 2 DIABETES MELLITUS WITH DIABETIC POLYNEUROPATHY, WITH LONG-TERM CURRENT USE OF INSULIN (HCC): ICD-10-CM

## 2020-02-21 DIAGNOSIS — Z79.4 TYPE 2 DIABETES MELLITUS WITH DIABETIC POLYNEUROPATHY, WITH LONG-TERM CURRENT USE OF INSULIN (HCC): ICD-10-CM

## 2020-02-21 DIAGNOSIS — R29.6 FREQUENT FALLS: ICD-10-CM

## 2020-02-21 PROCEDURE — 99309 SBSQ NF CARE MODERATE MDM 30: CPT | Performed by: NURSE PRACTITIONER

## 2020-02-21 NOTE — PROGRESS NOTES
Verner Jester, 92/1922, 80year old, female    Chief Complaint:  Patient presents with: Follow - Up: falls, influenza, rhabado, azotemia, ARF.  DM 2, vascular dementia  Weakness  Pain       Subjective:    PMH significant for Dementia, Depression, T2 DM w 2+  Lateral RLE at mid-fibula w/ ~2.5 cm diameter, round, raised, skin colored mass, not painful to palpation, firm mass CMS intact distal to injury  NEUROLOGIC: Alert, oriented to self, some events, not time or place.  no focal deficits, follows commands, dressed  - pain is minimal  - Tylenol/ibuprofen prn     Influenza A  - tamiflu completed  - monitoring     Vascular dementia with acute metabolic encephalopathy and delirium   - alert to self, some events,  - continue with reorientation  - safety monitorin

## 2020-02-25 ENCOUNTER — SNF DISCHARGE (OUTPATIENT)
Dept: INTERNAL MEDICINE CLINIC | Age: 85
End: 2020-02-25

## 2020-02-25 VITALS
OXYGEN SATURATION: 95 % | DIASTOLIC BLOOD PRESSURE: 70 MMHG | SYSTOLIC BLOOD PRESSURE: 132 MMHG | HEART RATE: 80 BPM | TEMPERATURE: 97 F | RESPIRATION RATE: 18 BRPM

## 2020-02-25 DIAGNOSIS — S82.101D CLOSED FRACTURE OF PROXIMAL END OF RIGHT TIBIA WITH ROUTINE HEALING, UNSPECIFIED FRACTURE MORPHOLOGY, SUBSEQUENT ENCOUNTER: Primary | ICD-10-CM

## 2020-02-25 DIAGNOSIS — R53.1 WEAKNESS GENERALIZED: ICD-10-CM

## 2020-02-25 DIAGNOSIS — F01.51 VASCULAR DEMENTIA WITH BEHAVIOR DISTURBANCE (HCC): ICD-10-CM

## 2020-02-25 DIAGNOSIS — M25.512 CHRONIC PAIN OF BOTH SHOULDERS: ICD-10-CM

## 2020-02-25 DIAGNOSIS — L03.116 CELLULITIS OF LEFT ANTERIOR LOWER LEG: ICD-10-CM

## 2020-02-25 DIAGNOSIS — Z79.4 TYPE 2 DIABETES MELLITUS WITH DIABETIC POLYNEUROPATHY, WITH LONG-TERM CURRENT USE OF INSULIN (HCC): ICD-10-CM

## 2020-02-25 DIAGNOSIS — M25.511 CHRONIC PAIN OF BOTH SHOULDERS: ICD-10-CM

## 2020-02-25 DIAGNOSIS — G89.29 CHRONIC PAIN OF BOTH SHOULDERS: ICD-10-CM

## 2020-02-25 DIAGNOSIS — E11.42 TYPE 2 DIABETES MELLITUS WITH DIABETIC POLYNEUROPATHY, WITH LONG-TERM CURRENT USE OF INSULIN (HCC): ICD-10-CM

## 2020-02-25 PROCEDURE — 99316 NF DSCHRG MGMT 30 MIN+: CPT | Performed by: NURSE PRACTITIONER

## 2020-02-25 NOTE — PROGRESS NOTES
Deo Benson, 92/1922, 80year old, female is being discharged from [address] [place] [address] at [address] [facility]    Date of Admission: 2/5/2020    Date of Discharge:2/25/2020                                 Admitting Diagnoses:  Perip FROM  BREAST: deferred  RESPIRATORY:clear, no crackles, no wheezing, no dyspnea, no cough, room air  CARDIOVASCULAR: RRR, S1 and S2, no murmurs, mild pitting pretibial edema  ABDOMEN:  normal active BS+, soft, nondistended; no organomegaly, no masses; nont Discharge Diagnoses w/ current management:    Falls, weakness, ADL dysfunction 2/2 influenza infection, peripheral neuropathy  Safety monitoring with falls and weakness  PT/OT eval and treat  ELOS 14 days  DC 2/25 with rehab and SW evals, king chaidez

## 2020-03-12 NOTE — DISCHARGE SUMMARY
[facility]    PATIENT'S NAME: Carly Taylor   ATTENDING PHYSICIAN: Hayde Costa.  Neel Babb MD   PATIENT ACCOUNT#:   [id number]    LOCATION:  [address] [place] [address] #:   [id number]       YOB: 1922  ADMISSION DATE:       02/01/202 hemoglobin 13.6, and platelet count of 562,397. Her creatinine was 1.21, her BUN 25. Her CK was 294. CT of the brain showed microvascular changes along with large vessel disease. Chest x-ray showed bibasilar atelectasis without pneumonia.     HOSPIT

## 2020-11-12 ENCOUNTER — HOSPITAL (OUTPATIENT)
Dept: OTHER | Age: 85
End: 2020-11-12

## 2020-11-12 LAB
ABS LYMPH: 1.3 K/CUMM (ref 1–3.5)
ABS MONO: 0.8 K/CUMM (ref 0.1–0.8)
ABS NEUTRO: 3.2 K/CUMM (ref 2–8)
ALBUMIN SERPL BCP-MCNC: 3.6 G/DL (ref 3.5–5)
ALBUMIN/GLOB SERPL: 1.2 {RATIO}
ALP SERPL-CCNC: 107 UNIT/L (ref 50–124)
ALT SERPL W/O P-5'-P-CCNC: 18 UNIT/L (ref 0–55)
ANION GAP SERPL CALC-SCNC: 12 MEQ/L (ref 10–20)
AST SERPL-CCNC: 21 UNIT/L (ref 5–34)
BASOPHIL: 0 % (ref 0–1)
BILIRUB SERPL-MCNC: 0.3 MG/DL (ref 0.2–1)
BUN SERPL-MCNC: 34 MG/DL (ref 6–20)
CALCIUM SERPL-MCNC: 9.1 MG/DL (ref 8.4–10.2)
CHLORIDE SERPL-SCNC: 104 MEQ/L (ref 97–107)
CO2 SERPL-SCNC: 27 MEQ/L (ref 19–29)
CREAT SERPL-MCNC: 0.91 MG/DL (ref 0.6–1.3)
EOSINOPHIL: 2 % (ref 0–6)
GLOBULIN SER CALC-MCNC: 2.9 G/DL (ref 2–4.1)
GLUCOSE SERPL-MCNC: 130 MG/DL (ref 70–99)
HCT VFR BLD CALC: 37 % (ref 33–45)
HGB BLD-MCNC: 11.7 G/DL (ref 11–15)
IMMATURE GRAN: 0.9 % (ref 0–0.3)
LYMPHOCYTE: 24 %
MCH RBC QN AUTO: 32 PG (ref 25–35)
MCHC RBC AUTO-ENTMCNC: 32 G/DL (ref 32–37)
MCV RBC AUTO: 101 FL (ref 78–97)
MONOCYTE: 14 %
NEUTROPHIL: 59 %
NRBC BLD MANUAL-RTO: 0 % (ref 0–0.2)
PLATELET: 203 K/CUMM (ref 150–450)
POTASSIUM SERPL-SCNC: 4.3 MEQ/L (ref 3.5–5.1)
PROT SERPL-MCNC: 6.5 G/DL (ref 6.4–8.3)
RBC # BLD: 3.64 M/CUMM (ref 3.7–5.2)
RDW: 13.3 % (ref 11.5–14.5)
SODIUM SERPL-SCNC: 139 MEQ/L (ref 136–145)
WBC # BLD: 5.5 K/CUMM (ref 4–11)

## (undated) NOTE — ED AVS SNAPSHOT
Tabatha Abrams   MRN: [id number]    Department:  [facility] Emergency Department   Date of Visit:  10/25/2017           Disclosure     Insurance plans vary and the physician(s) referred by the ER may not be covered by your plan.  Please contact CARE PHYSICIAN AT ONCE OR RETURN IMMEDIATELY TO THE EMERGENCY DEPARTMENT. If you have been prescribed any medication(s), please fill your prescription right away and begin taking the medication(s) as directed.   If you believe that any of the medications

## (undated) NOTE — ED AVS SNAPSHOT
Janessa Landon   MRN: [id number]    Department:  [facility] Emergency Department   Date of Visit:  11/15/2018           Disclosure     Insurance plans vary and the physician(s) referred by the ER may not be covered by your plan.  Please contact within the next three months to obtain basic health screening including reassessment of your blood pressure.     IF THERE IS ANY CHANGE OR WORSENING OF YOUR CONDITION, CALL YOUR PRIMARY CARE PHYSICIAN AT ONCE OR RETURN IMMEDIATELY TO THE EMERGENCY DEPARTMEN

## (undated) NOTE — ED AVS SNAPSHOT
Dada Chester   MRN: [id number]    Department:  [facility] Emergency Department   Date of Visit:  1/31/2020           Disclosure     Insurance plans vary and the physician(s) referred by the ER may not be covered by your plan.  Please contact y within the next three months to obtain basic health screening including reassessment of your blood pressure.     IF THERE IS ANY CHANGE OR WORSENING OF YOUR CONDITION, CALL YOUR PRIMARY CARE PHYSICIAN AT ONCE OR RETURN IMMEDIATELY TO THE EMERGENCY DEPARTMEN

## (undated) NOTE — IP AVS SNAPSHOT
[facility]            (For Outpatient Use Only) Initial Admit Date: 2/1/2020   Inpt/Obs Admit Date: Inpt: 2/1/20 / Obs: 02/01/20   Discharge Date:    Mo Caldera:  [de-identified]   MRN: [de-identified]   CSN: [id number]   CEID: [id number] Group Number:  Insurance Type: INDEMNITY   Subscriber Name: Bonnie Carr : 1922   Subscriber ID: [id number] Pt Rel to Subscriber: [facility] Account Financial Class: Medicare    2020

## (undated) NOTE — ED AVS SNAPSHOT
Elodia Arita   MRN: [id number]    Department:  [facility] Emergency Department   Date of Visit:  1/30/2020           Disclosure     Insurance plans vary and the physician(s) referred by the ER may not be covered by your plan.  Please contact y within the next three months to obtain basic health screening including reassessment of your blood pressure.     IF THERE IS ANY CHANGE OR WORSENING OF YOUR CONDITION, CALL YOUR PRIMARY CARE PHYSICIAN AT ONCE OR RETURN IMMEDIATELY TO THE EMERGENCY DEPARTMEN

## (undated) NOTE — IP AVS SNAPSHOT
Patient Demographics     Address  [address] [place] [address] Phone  [phone] [facility])      Emergency Contact(s)     Name Relation Home Work Antonella Tonia Griselda Daughter   [phone]    Mark Hernandez [phone] Commonly known as:  NOVOLOG  Next dose due: With lunch/dinner      Inject 1-5 Units into the skin 3 (three) times daily with meals.  CORRECTION FACTOR - LOW DOSE  Continue to give correction insulin even if NPO  DO NOT HOLD OR ALTER INSULIN DOSE WITHOUT A acetaminophen 325 MG Tabs  ibuprofen 200 MG Tabs  Miconazole Nitrate 2 % Powd     Please  your prescriptions at the location directed by your doctor or nurse    Bring a paper prescription for each of these medications  Insulin Aspart Pen 100 UNIT/ML Order ID Medication Name Action Time Action Reason Comments    [id number] Insulin Aspart Pen (NOVOLOG) 100 UNIT/ML flexpen 1-5 Units 02/04/20 1219 Given  bg 226    [id number] Insulin Aspart Pen (NOVOLOG) 100 UNIT/ML flexpen 6 Units 02/04/20 1219 Given Hosp Day # 0 PCP Toby Summers MD     Date:  2/1/2020  Date of Admission:  2/1/2020    History provided by:patient  HPI:   Patient presents with:  Fall    HPI Increased confusion and generalized muscular weakness leading to daily falls and 3 trips to the E ibuprofen 200 MG Oral Tab, Take 200 mg by mouth every 4 (four) hours as needed for Pain. Albuterol Sulfate  (90 Base) MCG/ACT Inhalation Aero Soln, Inhale 2 puffs into the lungs every 4 (four) hours as needed for Wheezing.   Sennosides-Docusate Sodi Musculoskeletal: Normal range of motion. Neurological: She is alert. No cranial nerve deficit, sensory deficit or motor deficit. Coordination and gait abnormal.   Oriented x 1. Skin:   Severe groin fold intertrigo.    Psychiatric: Her mood appears anxio Result Date: 2/1/2020  CONCLUSION:  1. No acute intracranial process. 2.  There are mild microvascular white matter ischemic changes, likely related to long-standing hypertension and/or diabetes.  3.  Atherosclerosis in the anterior and posterior circulati CONCLUSION:   Mild bibasilar atelectasis, otherwise no acute cardiopulmonary abnormality. Preliminary report was submitted by the [facility] teleradiologist and there are no significant discrepancies.    Dictated by (CST): Alicia Toussaint MD on 1/31/2020 at 6:26 Based on patients current state of illness, I anticipate that, after discharge, patient will require TBD. Naheed Michel MD  2/1/2020[JH.1]    Electronically signed by Jermaine Mckeon MD on 2/1/2020  5:13 PM   [facility]. 1 - Jermaine Mckeon, unable to reason with therapy.  Patient does not have the physical skills to return to prior living environment upon D/C from the hospital. Anticipate patient will benefit from continued skilled physical therapy while in the hospital and upon D/C from  hos CMS Modifier (G-Code): CL    FUNCTIONAL ABILITY STATUS  Gait Assessment   Gait Assistance: Moderate assistance  Distance (ft): 20ft  Assistive Device: Rolling walker     Stoop/Curb Assistance: Not tested     Patient End of Session: Up in chair;Needs met; Ca Therapist spoke with nurse prior to seeing patient. Co-treated with OT today. Pt presented in bed, agreed to session, vitals taken, treatment proceeded. Droplet precautions. She needed mod A x 1 for supine/inclined to sitting transfer.   Pt scooted to E PHYSICAL THERAPY MEDICAL/SOCIAL HISTORY     History related to current admission: frequent falls, cutaneous candidiasis.      Problem List  Active Problems:    PN (peripheral neuropathy)    Cutaneous candidiasis    Traumatic rhabdomyolysis ([address]    Frequent Lower extremity strength is within functional limits, though some decrease in lower extremities.     ACTIVITY TOLERANCE  Pulse: 64        BP: 142/66             O2 WALK  SPO2 on Room Air at Rest: 98               AM-PAC '6-Clicks' INPATIENT SHORT FORM - BAS JS.1 Yasmeen Code, PT on 2/2/2020  3:59 PM                        Occupational Therapy Notes (last 72 hours) (Notes from 2/2/2020 10:59 AM through 2/5/2020 10:59 AM)      Occupational Therapy Note signed by Sherry Stacy at 2/4/2020  4:34 PM  Vers of the patient, therapy was unable to continue. [CS.1] Provided patient with active listening.[CS.2]      The patient's Approx Degree of Impairment: 53.32% has been calculated based on documentation in the [facility] '6 clicks' Inpatient Daily Activity Short For Bedroom Mobility: mod A to curtain with chair follow    BALANCE ASSESSMENT  Static Sitting: fair  Dynamic Sitting: fair-  Static Standing: fair-  Dynamic Standing: poor    FUNCTIONAL ADL ASSESSMENT--limited OT assessment due to confusion and frustration (R Patient is a[EB.3 80year old[EB.2] female admitted 2/1/2020 for s/p fall with weakness. Patient chart reviewed and therapist consulted with RN prior to seeing patient.  Patient supine in bed at beginning of session, but agreeable to participate in therapy OT Treatment Plan: Balance activities; Energy conservation/work simplification techniques;ADL training;IADL training;Visual perceptual training;Functional transfer training;UE strengthening/ROM; Endurance training;Neuromuscluar reeducation[EB.2]       Elena Cat OCCUPATIONAL THERAPY EXAMINATION      OBJECTIVE[EB.1]  Precautions: None(droplet)  Fall Risk: High fall risk[EB.2]    PAIN ASSESSMENT[EB.1]  Ratin[EB. 2]        COGNITION  Overall Cognitive Status:  Impaired and AOx2    VISION  Current Vision: reports d Education Provided: role of OT, fall prevention, body mechanics[EB.1]  Patient End of Session: Up in chair;Needs met;Call light within reach;RN aware of session/findings; All patient questions and concerns addressed; Alarm set[EB.2]    OT Goals  Patients gwen

## (undated) NOTE — ED AVS SNAPSHOT
Jeanette Leyden   MRN: [id number]    Department:  [facility] Emergency Department   Date of Visit:  9/15/2017           Disclosure     Insurance plans vary and the physician(s) referred by the ER may not be covered by your plan.  Please contact y CARE PHYSICIAN AT ONCE OR RETURN IMMEDIATELY TO THE EMERGENCY DEPARTMENT. If you have been prescribed any medication(s), please fill your prescription right away and begin taking the medication(s) as directed.   If you believe that any of the medications